# Patient Record
Sex: MALE | Race: BLACK OR AFRICAN AMERICAN | NOT HISPANIC OR LATINO | Employment: FULL TIME | URBAN - METROPOLITAN AREA
[De-identification: names, ages, dates, MRNs, and addresses within clinical notes are randomized per-mention and may not be internally consistent; named-entity substitution may affect disease eponyms.]

---

## 2017-07-18 ENCOUNTER — ALLSCRIPTS OFFICE VISIT (OUTPATIENT)
Dept: OTHER | Facility: OTHER | Age: 37
End: 2017-07-18

## 2018-01-10 NOTE — RESULT NOTES
Verified Results  (1) TSH WITH FT4 REFLEX 29Jun2016 12:34PM [facility] Order Number: [id number]   Order Number: [id number]     Test Name Result Flag Reference   TSH 0 518 uIU/mL  0 358-3 740   Patients undergoing fluorescein dye angiography may retain small amounts of fluorescein in the body for 48-72 hours post procedure  Samples containing fluorescein can produce falsely depressed TSH values  If the patient had this procedure,a specimen should be resubmitted post fluorescein clearance

## 2018-01-14 VITALS
BODY MASS INDEX: 30.24 KG/M2 | OXYGEN SATURATION: 98 % | DIASTOLIC BLOOD PRESSURE: 80 MMHG | HEIGHT: 65 IN | RESPIRATION RATE: 14 BRPM | SYSTOLIC BLOOD PRESSURE: 120 MMHG | WEIGHT: 181.5 LBS | HEART RATE: 100 BPM

## 2018-01-14 NOTE — MISCELLANEOUS
Message  Return to work or school:        Patient is scheduled for a procedure on 10/7/16  He will return to work on 10/10/16  He will require light duty (no lifting> 10 lbs) until 10/14/16  URI Hamm        Signatures   Electronically signed by : URI Hamm ; Sep 18 2016 10:18PM EST

## 2018-01-14 NOTE — PROGRESS NOTES
Assessment    1  Tobacco use (305 1) (Z72 0)   2  Encounter for preventive health examination (V70 0) (Z00 00)    Plan  Blood pressure elevated    · (1) BASIC METABOLIC PROFILE; Status:Active; Requested for:[id number];   Encounter for screening for lipid disorder    · (1) LIPID PANEL, FASTING; Status:Active; Requested for:[id number];     Discussion/Summary  Impression: health maintenance visit  Currently, he eats a healthy diet and has an adequate exercise regimen  Testicular cancer screening: testicular cancer screening is current  He was advised to be evaluated by a dentist      Veronica Herrera was seen and examined in the office today  Physical examination was noted to be largely within normal limits  He denies cardiac symptoms but was noted to be slightly tachycardic today  He is due for screening blood work and this was given to him  Otherwise no other changes were made  The patient was counseled regarding instructions for management  Possible side effects of new medications were reviewed with the patient/guardian today  The treatment plan was reviewed with the patient/guardian  The patient/guardian understands and agrees with the treatment plan      Chief Complaint  Physical      History of Present Illness  HM, Adult Male: The patient is being seen for a health maintenance evaluation  Social History: Work status: working full time and occupation: [employer]  The patient is a current cigarette smoker  General Health: The patient's health since the last visit is described as good  He has regular dental visits  He denies vision problems  He denies hearing loss  Lifestyle:  He consumes a diverse and healthy diet  He does not have any weight concerns  He exercises regularly  He uses tobacco  He consumes alcohol  Screening:   HPI: Patient is here for yearly physical for upcoming camp participation  He has done this in the past as well without trouble  Medical chart was reviewed and updated   He specifically denies any complaints  Review of Systems    Constitutional: No fever or chills, feels well, no tiredness, no recent weight gain or weight loss  Eyes: No complaints of eye pain, no red eyes, no discharge from eyes, no itchy eyes  ENT: no complaints of earache, no hearing loss, no nosebleeds, no nasal discharge, no sore throat, no hoarseness  Cardiovascular: No complaints of slow heart rate, no fast heart rate, no chest pain, no palpitations, no leg claudication, no lower extremity  Respiratory: No complaints of shortness of breath, no wheezing, no cough, no SOB on exertion, no orthopnea or PND  Gastrointestinal: No complaints of abdominal pain, no constipation, no nausea or vomiting, no diarrhea or bloody stools  Genitourinary: no dysuria and no nocturia  Musculoskeletal: arthralgias, but no joint swelling, no limb pain, no myalgias and no joint stiffness  Integumentary: No complaints of skin rash or skin lesions, no itching, no skin wound, no dry skin  Neurological: no headache, no numbness, no tingling and no dizziness  Psychiatric: no anxiety, no sleep disturbances and no depression  Hematologic/Lymphatic: No complaints of swollen glands, no swollen glands in the neck, does not bleed easily, no easy bruising  Active Problems    1  Blood pressure elevated (401 9) (I10)   2  Situational anxiety (300 09) (F41 8)   3   Tobacco use (305 1) (Z72 0)    Past Medical History    · History of Abnormal LFTs (790 6) (R79 89)   · History of Rectal hemorrhage (569 3) (K62 5)    Surgical History    · History of Surgery Vas Deferens Vasectomy    Family History  Mother    · Family history of Hiatal Hernia   · Family history of Hypertension (V17 49)   · Family history of Malignant Melanoma Of The Skin (V16 8)   · Family history of Polyps Of The Sigmoid Colon  Father    · Family history of Family Health Status Of Father - Good  Brother    · Family history of Hypertension (V17 49)  Maternal Grandmother    · Family history of Colon Cancer (V16 0)    Social History    · Being A Social Drinker   · Current Every Day Smoker (305 1)   · Denied: History of Drug Use   · Tobacco use (305 1) (Z72 0)    Current Meds   1  No Reported Medications Recorded    Allergies    1  No Known Drug Allergies    Vitals   Recorded: [id number] 09:47AM Recorded: 59NCF3093 09:21AM   Heart Rate 100 115   Respiration Quality  Normal   Respiration  14   Systolic  696, LUE, Sitting   Diastolic  80, LUE, Sitting   Height  5 ft 5 in   Weight  181 lb 8 oz   BMI Calculated  30 2   BSA Calculated  1 9   O2 Saturation  98, RA     Physical Exam    Constitutional   General appearance: No acute distress, well appearing and well nourished  Head and Face   Head and face: Normal     Palpation of the face and sinuses: No sinus tenderness  Eyes   Conjunctiva and lids: No erythema, swelling or discharge  Pupils and irises: Equal, round, reactive to light  Ears, Nose, Mouth, and Throat   External inspection of ears and nose: Normal     Otoscopic examination: Tympanic membranes translucent with normal light reflex  Canals patent without erythema  Hearing: Normal     Nasal mucosa, septum, and turbinates: Normal without edema or erythema  Oropharynx: Normal with no erythema, edema, exudate or lesions  Neck   Neck: Supple, symmetric, trachea midline, no masses  Thyroid: Normal, no thyromegaly  Pulmonary   Respiratory effort: No increased work of breathing or signs of respiratory distress  Auscultation of lungs: Clear to auscultation  Cardiovascular   Palpation of heart: Normal PMI, no thrills  Auscultation of heart: Normal rate and rhythm, normal S1 and S2, no murmurs  Pedal pulses: 2+ bilaterally  Examination of extremities for edema and/or varicosities: Normal     Abdomen   Abdomen: Non-tender, no masses  Liver and spleen: No hepatomegaly or splenomegaly      Genitourinary   Scrotal contents: Normal testes, no masses  Penis: Normal, no lesions  Lymphatic   Palpation of lymph nodes in neck: No lymphadenopathy  Musculoskeletal   Gait and station: Normal     Inspection/palpation of digits and nails: Normal without clubbing or cyanosis  Inspection/palpation of joints, bones, and muscles: Normal     Range of motion: Normal     Stability: Normal     Muscle strength/tone: Normal     Skin   Skin and subcutaneous tissue: Normal without rashes or lesions  Lipoma like lesion noted of the low back (right)  Neurologic   Cranial nerves: Cranial nerves 2-12 intact  Cortical function: Normal mental status  Reflexes: 2+ and symmetric  Sensation: No sensory loss  Psychiatric   Judgment and insight: Normal     Orientation to person, place and time: Normal     Recent and remote memory: Intact  Mood and affect: Normal        Procedure    Procedure: Indication: routine screening  Inforrmation supplied by Robert graham Snellen chart     Results: 20/20 in both eyes without corrective device, 20/25 in the right eye without corrective device, 20/14 in the left eye without corrective device      Signatures   Electronically signed by : Cordell Todd DO; Jul 18 2017  9:51AM EST                       (Author)

## 2018-04-06 ENCOUNTER — TRANSCRIBE ORDERS (OUTPATIENT)
Dept: LAB | Facility: CLINIC | Age: 38
End: 2018-04-06

## 2018-04-06 ENCOUNTER — OFFICE VISIT (OUTPATIENT)
Dept: FAMILY MEDICINE CLINIC | Facility: CLINIC | Age: 38
End: 2018-04-06
Payer: COMMERCIAL

## 2018-04-06 ENCOUNTER — APPOINTMENT (OUTPATIENT)
Dept: LAB | Facility: CLINIC | Age: 38
End: 2018-04-06
Payer: COMMERCIAL

## 2018-04-06 VITALS
SYSTOLIC BLOOD PRESSURE: 132 MMHG | HEIGHT: 65 IN | DIASTOLIC BLOOD PRESSURE: 100 MMHG | WEIGHT: 197.25 LBS | TEMPERATURE: 98.3 F | HEART RATE: 100 BPM | OXYGEN SATURATION: 98 % | BODY MASS INDEX: 32.86 KG/M2

## 2018-04-06 DIAGNOSIS — I10 ESSENTIAL (PRIMARY) HYPERTENSION: Primary | ICD-10-CM

## 2018-04-06 DIAGNOSIS — I10 ESSENTIAL (PRIMARY) HYPERTENSION: ICD-10-CM

## 2018-04-06 LAB
ALBUMIN SERPL BCP-MCNC: 4.4 G/DL (ref 3.5–5)
ALP SERPL-CCNC: 94 U/L (ref 46–116)
ALT SERPL W P-5'-P-CCNC: 115 U/L (ref 12–78)
ANION GAP SERPL CALCULATED.3IONS-SCNC: 5 MMOL/L (ref 4–13)
AST SERPL W P-5'-P-CCNC: 31 U/L (ref 5–45)
BASOPHILS # BLD AUTO: 0.02 THOUSANDS/ΜL (ref 0–0.1)
BASOPHILS NFR BLD AUTO: 0 % (ref 0–1)
BILIRUB SERPL-MCNC: 0.89 MG/DL (ref 0.2–1)
BUN SERPL-MCNC: 14 MG/DL (ref 5–25)
CALCIUM SERPL-MCNC: 9 MG/DL (ref 8.3–10.1)
CHLORIDE SERPL-SCNC: 105 MMOL/L (ref 100–108)
CHOLEST SERPL-MCNC: 168 MG/DL (ref 50–200)
CO2 SERPL-SCNC: 30 MMOL/L (ref 21–32)
CREAT SERPL-MCNC: 0.82 MG/DL (ref 0.6–1.3)
EOSINOPHIL # BLD AUTO: 0.07 THOUSAND/ΜL (ref 0–0.61)
EOSINOPHIL NFR BLD AUTO: 1 % (ref 0–6)
ERYTHROCYTE [DISTWIDTH] IN BLOOD BY AUTOMATED COUNT: 12.3 % (ref 11.6–15.1)
GFR SERPL CREATININE-BSD FRML MDRD: 131 ML/MIN/1.73SQ M
GLUCOSE P FAST SERPL-MCNC: 88 MG/DL (ref 65–99)
HCT VFR BLD AUTO: 48.6 % (ref 36.5–49.3)
HDLC SERPL-MCNC: 58 MG/DL (ref 40–60)
HGB BLD-MCNC: 16 G/DL (ref 12–17)
LDLC SERPL CALC-MCNC: 96 MG/DL (ref 0–100)
LYMPHOCYTES # BLD AUTO: 3.02 THOUSANDS/ΜL (ref 0.6–4.47)
LYMPHOCYTES NFR BLD AUTO: 34 % (ref 14–44)
MCH RBC QN AUTO: 30.4 PG (ref 26.8–34.3)
MCHC RBC AUTO-ENTMCNC: 32.9 G/DL (ref 31.4–37.4)
MCV RBC AUTO: 92 FL (ref 82–98)
MONOCYTES # BLD AUTO: 0.54 THOUSAND/ΜL (ref 0.17–1.22)
MONOCYTES NFR BLD AUTO: 6 % (ref 4–12)
NEUTROPHILS # BLD AUTO: 5.31 THOUSANDS/ΜL (ref 1.85–7.62)
NEUTS SEG NFR BLD AUTO: 59 % (ref 43–75)
NRBC BLD AUTO-RTO: 0 /100 WBCS
PLATELET # BLD AUTO: 250 THOUSANDS/UL (ref 149–390)
PMV BLD AUTO: 10.2 FL (ref 8.9–12.7)
POTASSIUM SERPL-SCNC: 3.4 MMOL/L (ref 3.5–5.3)
PROT SERPL-MCNC: 7.3 G/DL (ref 6.4–8.2)
RBC # BLD AUTO: 5.26 MILLION/UL (ref 3.88–5.62)
SODIUM SERPL-SCNC: 140 MMOL/L (ref 136–145)
TRIGL SERPL-MCNC: 69 MG/DL
TSH SERPL DL<=0.05 MIU/L-ACNC: 1.53 UIU/ML (ref 0.36–3.74)
WBC # BLD AUTO: 9.01 THOUSAND/UL (ref 4.31–10.16)

## 2018-04-06 PROCEDURE — 85025 COMPLETE CBC W/AUTO DIFF WBC: CPT

## 2018-04-06 PROCEDURE — 84443 ASSAY THYROID STIM HORMONE: CPT

## 2018-04-06 PROCEDURE — 80061 LIPID PANEL: CPT

## 2018-04-06 PROCEDURE — 99213 OFFICE O/P EST LOW 20 MIN: CPT | Performed by: PHYSICIAN ASSISTANT

## 2018-04-06 PROCEDURE — 80053 COMPREHEN METABOLIC PANEL: CPT

## 2018-04-06 PROCEDURE — 36415 COLL VENOUS BLD VENIPUNCTURE: CPT

## 2018-04-06 RX ORDER — LISINOPRIL 10 MG/1
10 TABLET ORAL DAILY
Qty: 30 TABLET | Refills: 1 | Status: SHIPPED | OUTPATIENT
Start: 2018-04-06 | End: 2018-05-17 | Stop reason: SDUPTHER

## 2018-04-06 NOTE — PROGRESS NOTES
[facility]  FAMILY PRACTICE OFFICE VISIT       NAME: Ana Lilia Lala  AGE: 40 y o  SEX: male       : 1980        MRN: [id number]    DATE: 2018  TIME: 5:47 PM    Assessment and Plan     Problem List Items Addressed This Visit     Essential (primary) hypertension - Primary       I reviewed with the patient that his blood pressure is elevated on exam   He was restarted on the lisinopril that he reported using previously was at a higher dose of 10 mg daily  He was encouraged to get labs completed is ordered today  He should follow-up in about 1 month  He should call with problems or concerns  Relevant Medications    lisinopril (ZESTRIL) 10 mg tablet    Other Relevant Orders    Comprehensive metabolic panel (Completed)    Lipid Panel with Direct LDL reflex (Completed)    TSH, 3rd generation with T4 reflex (Completed)    CBC and differential (Completed)          Essential (primary) hypertension    I reviewed with the patient that his blood pressure is elevated on exam   He was restarted on the lisinopril that he reported using previously was at a higher dose of 10 mg daily  He was encouraged to get labs completed is ordered today  He should follow-up in about 1 month  He should call with problems or concerns  Chief Complaint     Chief Complaint   Patient presents with    Headache    High Blood Pressure     for a couple weeks        History of Present Illness   Ana Lilia Lala is a 40y o -year-old male who presents for high blood pressure and headaches  The patient presents today to follow-up on his blood pressure and headaches that he has been having over the last few weeks  He reports that his blood pressure is not checked  His last visit here in the office was about 9 months ago with Dr Carlyle Michelle  His blood pressure was 120/80 at that time  He reports that he was on lisinopril 5 mg daily at one point a few years ago   He was provided a slip for labs, which do not appear to been completed  He denies any chest pain, shortness of breath, dizziness, or blurry vision  Review of Systems   Review of Systems   Constitutional: Positive for fatigue (works night shift)  Negative for chills and fever  Respiratory: Negative for shortness of breath  Cardiovascular: Negative for chest pain, palpitations and leg swelling  Neurological: Positive for headaches  Negative for dizziness  Active Problem List     Patient Active Problem List   Diagnosis    Essential (primary) hypertension    Situational anxiety         Past Medical History:  Past Medical History:   Diagnosis Date    Abnormal LFTs     Rectal hemorrhage        Past Surgical History:  Past Surgical History:   Procedure Laterality Date    VASECTOMY      Vas Deferens       Family History:  Family History   Problem Relation Age of Onset    Hiatal hernia Mother     Hypertension Mother     Skin cancer Mother     Colon polyps Mother      Of the sigmoid colon    Hypertension Brother     Colon cancer Maternal Grandmother        Social History:  Social History     Social History    Marital status: /Civil Union     Spouse name: N/A    Number of children: N/A    Years of education: N/A     Occupational History    Not on file       Social History Main Topics    Smoking status: Current Every Day Smoker     Packs/day: 0 25    Smokeless tobacco: Never Used      Comment: tobacco use    Alcohol use Yes      Comment: Being a social drinker    Drug use: No    Sexual activity: Not on file     Other Topics Concern    Not on file     Social History Narrative    No narrative on file       Objective     Vitals:    04/06/18 1140   BP: 132/100   Pulse: 100   Temp: 98 3 °F (36 8 °C)   SpO2: 98%     Wt Readings from Last 3 Encounters:   04/06/18 89 5 kg (197 lb 4 oz)   07/18/17 82 3 kg (181 lb 8 oz)   10/27/16 81 3 kg (179 lb 2 1 oz)       Physical Exam   Constitutional: He appears well-developed and well-nourished  HENT:   Head: Normocephalic and atraumatic  Neck: Neck supple  No thyromegaly present  Cardiovascular: Normal rate, regular rhythm, normal heart sounds and intact distal pulses  No murmur heard  No carotid bruits noted   Pulmonary/Chest: Effort normal and breath sounds normal    Musculoskeletal: He exhibits no edema  Lymphadenopathy:     He has no cervical adenopathy  Neurological: He is alert  Skin: Skin is warm and dry  Psychiatric: He has a normal mood and affect  Vitals reviewed  Pertinent Laboratory/Diagnostic Studies:  Lab Results   Component Value Date    GLUCOSE 101 11/11/2014    BUN 14 04/06/2018    CREATININE 0 82 04/06/2018    CALCIUM 9 0 04/06/2018     04/06/2018    K 3 4 (L) 04/06/2018    CO2 30 04/06/2018     04/06/2018     Lab Results   Component Value Date     (H) 04/06/2018    AST 31 04/06/2018    ALKPHOS 94 04/06/2018    BILITOT 0 89 04/06/2018       Lab Results   Component Value Date    WBC 9 01 04/06/2018    HGB 16 0 04/06/2018    HCT 48 6 04/06/2018    MCV 92 04/06/2018     04/06/2018       Lab Results   Component Value Date    CHOL 168 04/06/2018     Lab Results   Component Value Date    TRIG 69 04/06/2018     Lab Results   Component Value Date    HDL 58 04/06/2018     Lab Results   Component Value Date    LDLCALC 96 04/06/2018           Orders Placed This Encounter   Procedures    Comprehensive metabolic panel    Lipid Panel with Direct LDL reflex    TSH, 3rd generation with T4 reflex    CBC and differential       ALLERGIES:  No Known Allergies    Current Medications     Current Outpatient Prescriptions   Medication Sig Dispense Refill    lisinopril (ZESTRIL) 10 mg tablet Take 1 tablet (10 mg total) by mouth daily 30 tablet 1     No current facility-administered medications for this visit  Health Maintenance   There are no preventive care reminders to display for this patient    Immunization History   Administered Date(s) Administered    Influenza Quadrivalent Preservative Free 3 years and older IM 11/11/2014    Tdap 08/03/2012       Wendi Flores PA-C  4/6/2018 5:47 PM  [facility]

## 2018-04-06 NOTE — ASSESSMENT & PLAN NOTE
I reviewed with the patient that his blood pressure is elevated on exam   He was restarted on the lisinopril that he reported using previously was at a higher dose of 10 mg daily  He was encouraged to get labs completed is ordered today  He should follow-up in about 1 month  He should call with problems or concerns

## 2018-04-09 DIAGNOSIS — E87.6 LOW BLOOD POTASSIUM: Primary | ICD-10-CM

## 2018-05-14 ENCOUNTER — APPOINTMENT (OUTPATIENT)
Dept: LAB | Facility: CLINIC | Age: 38
End: 2018-05-14
Payer: COMMERCIAL

## 2018-05-14 DIAGNOSIS — E87.6 LOW BLOOD POTASSIUM: ICD-10-CM

## 2018-05-14 LAB
ALBUMIN SERPL BCP-MCNC: 4.4 G/DL (ref 3.5–5)
ALP SERPL-CCNC: 81 U/L (ref 46–116)
ALT SERPL W P-5'-P-CCNC: 42 U/L (ref 12–78)
ANION GAP SERPL CALCULATED.3IONS-SCNC: 8 MMOL/L (ref 4–13)
AST SERPL W P-5'-P-CCNC: 11 U/L (ref 5–45)
BILIRUB SERPL-MCNC: 1.6 MG/DL (ref 0.2–1)
BUN SERPL-MCNC: 12 MG/DL (ref 5–25)
CALCIUM SERPL-MCNC: 9.3 MG/DL (ref 8.3–10.1)
CHLORIDE SERPL-SCNC: 102 MMOL/L (ref 100–108)
CO2 SERPL-SCNC: 29 MMOL/L (ref 21–32)
CREAT SERPL-MCNC: 0.78 MG/DL (ref 0.6–1.3)
GFR SERPL CREATININE-BSD FRML MDRD: 132 ML/MIN/1.73SQ M
GLUCOSE P FAST SERPL-MCNC: 80 MG/DL (ref 65–99)
POTASSIUM SERPL-SCNC: 3.4 MMOL/L (ref 3.5–5.3)
PROT SERPL-MCNC: 7.4 G/DL (ref 6.4–8.2)
SODIUM SERPL-SCNC: 139 MMOL/L (ref 136–145)

## 2018-05-14 PROCEDURE — 80053 COMPREHEN METABOLIC PANEL: CPT

## 2018-05-14 PROCEDURE — 36415 COLL VENOUS BLD VENIPUNCTURE: CPT

## 2018-05-16 NOTE — PROGRESS NOTES
[facility]  FAMILY PRACTICE OFFICE VISIT       NAME: Saji Mcintyre  AGE: 45 y o  SEX: male       : 1980        MRN: [id number]    DATE: 2018  TIME: 6:44 PM    Assessment and Plan     Problem List Items Addressed This Visit     Essential (primary) hypertension - Primary     Improved since last visit  He will continue with the lisinopril 10 mg daily  He did have a slight elevation of his diastolic reading today, but he did not take the medication yet today  He will return in 3 months for a recheck  He was encouraged to work on diet and exercise as well  Relevant Medications    lisinopril (ZESTRIL) 10 mg tablet    Abnormal LFTs       We reviewed that he had an elevated ALT of 115 on his labs last month; however, this normalized with his repeat labs done several days ago  His bilirubin now is elevated at 1 60 (and was previously normal at 0 98 last month )  We reviewed that his liver felt questionably enlarged on exam today  He was sent for an ultrasound to further assess this  He will also recheck a hepatic function panel in about 1 month  Relevant Orders    Hepatic function panel    Basic metabolic panel    US abdomen limited    Hypokalemia       We reviewed that he has a stable minimally decreased potassium level of 3 4  He has recently started eating potassium rich foods since finding out that was still decreased on his repeat labs done a few days ago  We will recheck this next month with his attic function panel  Relevant Orders    Basic metabolic panel          Essential (primary) hypertension  Improved since last visit  He will continue with the lisinopril 10 mg daily  He did have a slight elevation of his diastolic reading today, but he did not take the medication yet today  He will return in 3 months for a recheck  He was encouraged to work on diet and exercise as well       Abnormal LFTs    We reviewed that he had an elevated ALT of 115 on his labs last month; however, this normalized with his repeat labs done several days ago  His bilirubin now is elevated at 1 60 (and was previously normal at 0 98 last month )  We reviewed that his liver felt questionably enlarged on exam today  He was sent for an ultrasound to further assess this  He will also recheck a hepatic function panel in about 1 month  Hypokalemia    We reviewed that he has a stable minimally decreased potassium level of 3 4  He has recently started eating potassium rich foods since finding out that was still decreased on his repeat labs done a few days ago  We will recheck this next month with his attic function panel  Chief Complaint     Chief Complaint   Patient presents with    Follow-up     Month       History of Present Illness   Scar Quijano is a 45y o -year-old male who presents for 1 month blood pressure follow-up  Patient presents today for 1 month follow-up on his blood pressure  He presented last month with headaches and elevated blood pressure readings  He was restarted on lisinopril at the 10 mg daily dose  He reports that since that visit, his blood pressure readings have not been checked at home  He denies side effects from the medication such as cough  He denies symptoms of poor control such as ongoing headaches, chest pain dizziness blurry vision, or leg swelling  Of note, last month after his visit, he did blood work which included a normal cholesterol panel, normal glucose, normal renal function, normal blood cell counts  He was noted to have a mildly decreased potassium level of 3 4 as well as an elevated ALT of 115  He was asked to repeat these labs and this was done a few days ago  It showed he stable potassium level of 3 4 as well as resolution of his ALT elevation  However he this time elevation of his bilirubin at 1 60  He denies much alcohol use - occasional beer   He denies any recent abdominal pain, n/v           Review of Systems   Review of Systems   Constitutional: Negative for chills and fever  Respiratory: Negative for shortness of breath  Cardiovascular: Negative for chest pain, palpitations and leg swelling  Gastrointestinal: Negative for abdominal pain, nausea and vomiting  Neurological: Negative for dizziness and headaches  Active Problem List     Patient Active Problem List   Diagnosis    Essential (primary) hypertension    Situational anxiety    Abnormal LFTs    Hypokalemia         Past Medical History:  Past Medical History:   Diagnosis Date    Abnormal LFTs     Rectal hemorrhage        Past Surgical History:  Past Surgical History:   Procedure Laterality Date    VASECTOMY      Vas Deferens       Family History:  Family History   Problem Relation Age of Onset    Hiatal hernia Mother     Hypertension Mother     Skin cancer Mother     Colon polyps Mother      Of the sigmoid colon    Hypertension Brother     Colon cancer Maternal Grandmother        Social History:  Social History     Social History    Marital status: /Civil Union     Spouse name: N/A    Number of children: N/A    Years of education: N/A     Occupational History    Not on file  Social History Main Topics    Smoking status: Current Every Day Smoker     Packs/day: 0 25    Smokeless tobacco: Never Used      Comment: tobacco use    Alcohol use Yes      Comment: Being a social drinker    Drug use: No    Sexual activity: Not on file     Other Topics Concern    Not on file     Social History Narrative    No narrative on file       Objective     Vitals:    05/17/18 1134   BP: 126/90   Pulse:    SpO2:      Wt Readings from Last 3 Encounters:   05/17/18 88 5 kg (195 lb)   04/06/18 89 5 kg (197 lb 4 oz)   07/18/17 82 3 kg (181 lb 8 oz)       Physical Exam   Constitutional: He appears well-developed and well-nourished  HENT:   Head: Normocephalic and atraumatic  Neck: Neck supple   No thyromegaly present  Cardiovascular: Normal rate, regular rhythm, normal heart sounds and intact distal pulses  No murmur heard  No carotid bruits noted   Pulmonary/Chest: Effort normal and breath sounds normal    Musculoskeletal: He exhibits no edema  Lymphadenopathy:     He has no cervical adenopathy  Neurological: He is alert  Psychiatric: He has a normal mood and affect         Pertinent Laboratory/Diagnostic Studies:  Lab Results   Component Value Date    GLUCOSE 101 11/11/2014    BUN 12 05/14/2018    CREATININE 0 78 05/14/2018    CALCIUM 9 3 05/14/2018     05/14/2018    K 3 4 (L) 05/14/2018    CO2 29 05/14/2018     05/14/2018     Lab Results   Component Value Date    ALT 42 05/14/2018    AST 11 05/14/2018    ALKPHOS 81 05/14/2018    BILITOT 1 60 (H) 05/14/2018       Lab Results   Component Value Date    WBC 9 01 04/06/2018    HGB 16 0 04/06/2018    HCT 48 6 04/06/2018    MCV 92 04/06/2018     04/06/2018     Lab Results   Component Value Date    CHOL 168 04/06/2018     Lab Results   Component Value Date    TRIG 69 04/06/2018     Lab Results   Component Value Date    HDL 58 04/06/2018     Lab Results   Component Value Date    LDLCALC 96 04/06/2018     Results for orders placed or performed in visit on 05/14/18   Comprehensive metabolic panel   Result Value Ref Range    Sodium 139 136 - 145 mmol/L    Potassium 3 4 (L) 3 5 - 5 3 mmol/L    Chloride 102 100 - 108 mmol/L    CO2 29 21 - 32 mmol/L    Anion Gap 8 4 - 13 mmol/L    BUN 12 5 - 25 mg/dL    Creatinine 0 78 0 60 - 1 30 mg/dL    Glucose, Fasting 80 65 - 99 mg/dL    Calcium 9 3 8 3 - 10 1 mg/dL    AST 11 5 - 45 U/L    ALT 42 12 - 78 U/L    Alkaline Phosphatase 81 46 - 116 U/L    Total Protein 7 4 6 4 - 8 2 g/dL    Albumin 4 4 3 5 - 5 0 g/dL    Total Bilirubin 1 60 (H) 0 20 - 1 00 mg/dL    eGFR 132 ml/min/1 73sq m       Orders Placed This Encounter   Procedures    US abdomen limited    Hepatic function panel    Basic metabolic panel ALLERGIES:  No Known Allergies    Current Medications     Current Outpatient Prescriptions   Medication Sig Dispense Refill    lisinopril (ZESTRIL) 10 mg tablet Take 1 tablet (10 mg total) by mouth daily 90 tablet 3     No current facility-administered medications for this visit            Health Maintenance     Health Maintenance   Topic Date Due    HIV SCREENING  1980    INFLUENZA VACCINE  09/01/2018    Depression Screening PHQ-9  05/17/2019    DTaP,Tdap,and Td Vaccines (2 - Td) 08/03/2022     Immunization History   Administered Date(s) Administered    Influenza Quadrivalent Preservative Free 3 years and older IM 11/11/2014    Tdap 08/03/2012       Cody Arrington PA-C  5/17/2018 6:44 PM  [facility]

## 2018-05-17 ENCOUNTER — OFFICE VISIT (OUTPATIENT)
Dept: FAMILY MEDICINE CLINIC | Facility: CLINIC | Age: 38
End: 2018-05-17
Payer: COMMERCIAL

## 2018-05-17 VITALS
BODY MASS INDEX: 32.49 KG/M2 | DIASTOLIC BLOOD PRESSURE: 90 MMHG | HEART RATE: 92 BPM | WEIGHT: 195 LBS | HEIGHT: 65 IN | SYSTOLIC BLOOD PRESSURE: 126 MMHG | OXYGEN SATURATION: 95 %

## 2018-05-17 DIAGNOSIS — R79.89 ABNORMAL LFTS: ICD-10-CM

## 2018-05-17 DIAGNOSIS — E87.6 HYPOKALEMIA: ICD-10-CM

## 2018-05-17 DIAGNOSIS — I10 ESSENTIAL (PRIMARY) HYPERTENSION: Primary | ICD-10-CM

## 2018-05-17 PROCEDURE — 99213 OFFICE O/P EST LOW 20 MIN: CPT | Performed by: PHYSICIAN ASSISTANT

## 2018-05-17 RX ORDER — LISINOPRIL 10 MG/1
10 TABLET ORAL DAILY
Qty: 90 TABLET | Refills: 3 | Status: SHIPPED | OUTPATIENT
Start: 2018-05-17 | End: 2018-09-27 | Stop reason: SDUPTHER

## 2018-05-17 NOTE — ASSESSMENT & PLAN NOTE
We reviewed that he has a stable minimally decreased potassium level of 3 4  He has recently started eating potassium rich foods since finding out that was still decreased on his repeat labs done a few days ago  We will recheck this next month with his attic function panel

## 2018-05-17 NOTE — ASSESSMENT & PLAN NOTE
Improved since last visit  He will continue with the lisinopril 10 mg daily  He did have a slight elevation of his diastolic reading today, but he did not take the medication yet today  He will return in 3 months for a recheck  He was encouraged to work on diet and exercise as well

## 2018-05-17 NOTE — ASSESSMENT & PLAN NOTE
We reviewed that he had an elevated ALT of 115 on his labs last month; however, this normalized with his repeat labs done several days ago  His bilirubin now is elevated at 1 60 (and was previously normal at 0 98 last month )  We reviewed that his liver felt questionably enlarged on exam today  He was sent for an ultrasound to further assess this  He will also recheck a hepatic function panel in about 1 month

## 2018-05-17 NOTE — PATIENT INSTRUCTIONS
Essential (primary) hypertension  Improved since last visit  He will continue with the lisinopril 10 mg daily  He did have a slight elevation of his diastolic reading today, but he did not take the medication yet today  He will return in 3 months for a recheck  He was encouraged to work on diet and exercise as well  Abnormal LFTs    We reviewed that he had an elevated ALT of 115 on his labs last month; however, this normalized with his repeat labs done several days ago  His bilirubin now is elevated at 1 60 (and was previously normal at 0 98 last month )  We reviewed that his liver felt questionably enlarged on exam today  He was sent for an ultrasound to further assess this  He will also recheck a hepatic function panel in about 1 month  Hypokalemia    We reviewed that he has a stable minimally decreased potassium level of 3 4  He has recently started eating potassium rich foods since finding out that was still decreased on his repeat labs done a few days ago  We will recheck this next month with his attic function panel

## 2018-05-30 DIAGNOSIS — I10 ESSENTIAL (PRIMARY) HYPERTENSION: ICD-10-CM

## 2018-05-31 RX ORDER — LISINOPRIL 10 MG/1
10 TABLET ORAL DAILY
Qty: 30 TABLET | Refills: 3 | Status: SHIPPED | OUTPATIENT
Start: 2018-05-31 | End: 2018-08-28 | Stop reason: SDUPTHER

## 2018-08-25 NOTE — PROGRESS NOTES
[facility]  FAMILY PRACTICE OFFICE VISIT       NAME: Saji Mcintyre  AGE: 45 y o  SEX: male       : 1980        MRN: [id number]    DATE: 2018  TIME: 8:36 AM    Assessment and Plan     Problem List Items Addressed This Visit     Essential (primary) hypertension - Primary     Controlled  Continue lisinopril 10 mg daily  Aim for consistent daily use  Recheck in 6 months  Call with concerns in the interim  Work on weight loss - aim for 10 pounds by next visit  Abnormal LFTs     Improved  Will continue to monitor  Hypokalemia     Resolved  Will continue to monitor  Encouraged to continue potassium rich foods like bananas and tomatoes  Other Visit Diagnoses     Acute sinusitis, recurrence not specified, unspecified location        increase fluids and rest  Start Augmentin twice daily x 7 days  Continus Mucinex  Try nasal rinses  Call if worsens or fails to improve  Relevant Medications    amoxicillin-clavulanate (AUGMENTIN) 875-125 mg per tablet          Abnormal LFTs  Improved  Will continue to monitor  Hypokalemia  Resolved  Will continue to monitor  Encouraged to continue potassium rich foods like bananas and tomatoes  Essential (primary) hypertension  Controlled  Continue lisinopril 10 mg daily  Aim for consistent daily use  Recheck in 6 months  Call with concerns in the interim  Work on weight loss - aim for 10 pounds by next visit  Chief Complaint     Chief Complaint   Patient presents with    Follow-up     blood pressure     URI     coughing, chest congestion       History of Present Illness   Saji Mcintyre is a 45y o -year-old male who presents for 3 month follow-up visit  The patient reports that current blood pressure medications include lisinopril 10 mg daily  Blood pressure readings at home are not checked    The patient denies symptoms of poor control such as chest pain, shortness of breath, leg swelling, vision changes, headaches, or dizziness  The patient reports occasional soda for caffeine intake and unlimited salt intake  At the time of his last appointment about 3 months ago, it was noted that his ALT was elevated at 115 and he had also had an increase in bilirubin to 1 60  We discussed that his liver felt questionably enlarged on exam   He was given slips to have an ultrasound of the liver as well as a repeat hepatic function panel in 1 month  The labs were repeated and normalized but the ultrasound was not completed  He had also had a stable minimally decreased potassium level 3 4  He had been it encouraged to increase his intake of potassium rich foods with the plan to recheck that in 1 month with his liver function enzymes  The potassium normalized     Cough   This is a new problem  The current episode started more than 1 month ago  The problem has been unchanged (but worse when sleeps)  The cough is productive of purulent sputum (yellow)  Associated symptoms include nasal congestion and a sore throat  Pertinent negatives include no chest pain, chills, fever, headaches or shortness of breath  Associated symptoms comments: A lot of nasal discharge as well  The symptoms are aggravated by lying down  Treatments tried: Flonase, Mucinex, and other OTC meds  The treatment provided no relief  There is no history of asthma or environmental allergies  Review of Systems   Review of Systems   Constitutional: Negative for chills and fever  HENT: Positive for sore throat  Respiratory: Positive for cough  Negative for shortness of breath  Cardiovascular: Negative for chest pain, palpitations and leg swelling  Allergic/Immunologic: Negative for environmental allergies  Neurological: Negative for dizziness and headaches         Active Problem List     Patient Active Problem List   Diagnosis    Essential (primary) hypertension    Situational anxiety    Abnormal LFTs    Hypokalemia Past Medical History:  Past Medical History:   Diagnosis Date    Abnormal LFTs     Rectal hemorrhage        Past Surgical History:  Past Surgical History:   Procedure Laterality Date    VASECTOMY      Vas Deferens       Family History:  Family History   Problem Relation Age of Onset    Hiatal hernia Mother     Hypertension Mother     Skin cancer Mother     Colon polyps Mother         Of the sigmoid colon    Hypertension Brother     Colon cancer Maternal Grandmother        Social History:  Social History     Social History    Marital status: /Civil Union     Spouse name: N/A    Number of children: N/A    Years of education: N/A     Occupational History    Not on file  Social History Main Topics    Smoking status: Current Every Day Smoker     Packs/day: 0 25    Smokeless tobacco: Never Used      Comment: tobacco use    Alcohol use Yes      Comment: Being a social drinker    Drug use: No    Sexual activity: Not on file     Other Topics Concern    Not on file     Social History Narrative    No narrative on file       Objective     Vitals:    08/28/18 0807 08/28/18 0830   BP: 118/94 122/88   BP Location: Left arm    Patient Position: Sitting    Cuff Size: Large    Pulse: 86    Temp: (!) 97 2 °F (36 2 °C)    SpO2: 96%    Weight: 89 4 kg (197 lb 2 oz)    Height: 5' 5" (1 651 m)      Wt Readings from Last 3 Encounters:   08/28/18 89 4 kg (197 lb 2 oz)   05/17/18 88 5 kg (195 lb)   04/06/18 89 5 kg (197 lb 4 oz)       Physical Exam   Constitutional: He appears well-developed and well-nourished  HENT:   Head: Normocephalic and atraumatic  Right Ear: External ear normal    Left Ear: External ear normal    Nose: Mucosal edema (b/l) present  Right sinus exhibits no maxillary sinus tenderness and no frontal sinus tenderness  Left sinus exhibits no maxillary sinus tenderness and no frontal sinus tenderness  Mouth/Throat: Oropharynx is clear and moist    PND noted   Neck: Neck supple   No thyromegaly present  Cardiovascular: Normal rate, regular rhythm, normal heart sounds and intact distal pulses  No murmur heard  No carotid bruits noted   Pulmonary/Chest: Effort normal and breath sounds normal  He has no wheezes  He has no rales  Musculoskeletal: He exhibits no edema  Lymphadenopathy:     He has no cervical adenopathy  Neurological: He is alert  Psychiatric: He has a normal mood and affect         Pertinent Laboratory/Diagnostic Studies:  Lab Results   Component Value Date    GLUCOSE 101 11/11/2014    BUN 13 08/27/2018    CREATININE 0 82 08/27/2018    CALCIUM 8 9 08/27/2018     08/27/2018    K 3 5 08/27/2018    CO2 28 08/27/2018     08/27/2018     Lab Results   Component Value Date    ALT 36 08/27/2018    AST 12 08/27/2018    ALKPHOS 83 08/27/2018    BILITOT 1 73 (H) 11/11/2014       Lab Results   Component Value Date    WBC 9 01 04/06/2018    HGB 16 0 04/06/2018    HCT 48 6 04/06/2018    MCV 92 04/06/2018     04/06/2018     Lab Results   Component Value Date    CHOL 165 11/11/2014     Lab Results   Component Value Date    TRIG 69 04/06/2018     Lab Results   Component Value Date    HDL 58 04/06/2018     Lab Results   Component Value Date    LDLCALC 96 04/06/2018     Results for orders placed or performed in visit on 08/27/18   Hepatic function panel   Result Value Ref Range    Total Bilirubin 0 98 0 20 - 1 00 mg/dL    Bilirubin, Direct 0 24 (H) 0 00 - 0 20 mg/dL    Alkaline Phosphatase 83 46 - 116 U/L    AST 12 5 - 45 U/L    ALT 36 12 - 78 U/L    Total Protein 7 2 6 4 - 8 2 g/dL    Albumin 4 0 3 5 - 5 0 g/dL   Basic metabolic panel   Result Value Ref Range    Sodium 140 136 - 145 mmol/L    Potassium 3 5 3 5 - 5 3 mmol/L    Chloride 103 100 - 108 mmol/L    CO2 28 21 - 32 mmol/L    ANION GAP 9 4 - 13 mmol/L    BUN 13 5 - 25 mg/dL    Creatinine 0 82 0 60 - 1 30 mg/dL    Glucose, Fasting 79 65 - 99 mg/dL    Calcium 8 9 8 3 - 10 1 mg/dL    eGFR 130 ml/min/1 73sq m ALLERGIES:  No Known Allergies    Current Medications     Current Outpatient Prescriptions   Medication Sig Dispense Refill    amoxicillin-clavulanate (AUGMENTIN) 875-125 mg per tablet Take 1 tablet by mouth every 12 (twelve) hours for 7 days 14 tablet 0    lisinopril (ZESTRIL) 10 mg tablet Take 1 tablet (10 mg total) by mouth daily 90 tablet 3     No current facility-administered medications for this visit            Health Maintenance     Health Maintenance   Topic Date Due    Pneumococcal PPSV23 Medium Risk Adult (1 of 1 - PPSV23) 04/09/1999    INFLUENZA VACCINE  09/01/2018    Depression Screening PHQ-9  05/17/2019    DTaP,Tdap,and Td Vaccines (2 - Td) 08/03/2022     Immunization History   Administered Date(s) Administered    Influenza Quadrivalent Preservative Free 3 years and older IM 11/11/2014    Tdap 08/03/2012       Kade Lang PA-C  8/28/2018 8:36 AM  [facility]

## 2018-08-27 ENCOUNTER — APPOINTMENT (OUTPATIENT)
Dept: LAB | Facility: CLINIC | Age: 38
End: 2018-08-27
Payer: COMMERCIAL

## 2018-08-27 DIAGNOSIS — R79.89 ABNORMAL LFTS: ICD-10-CM

## 2018-08-27 DIAGNOSIS — E87.6 HYPOKALEMIA: ICD-10-CM

## 2018-08-27 LAB
ALBUMIN SERPL BCP-MCNC: 4 G/DL (ref 3.5–5)
ALP SERPL-CCNC: 83 U/L (ref 46–116)
ALT SERPL W P-5'-P-CCNC: 36 U/L (ref 12–78)
ANION GAP SERPL CALCULATED.3IONS-SCNC: 9 MMOL/L (ref 4–13)
AST SERPL W P-5'-P-CCNC: 12 U/L (ref 5–45)
BILIRUB DIRECT SERPL-MCNC: 0.24 MG/DL (ref 0–0.2)
BILIRUB SERPL-MCNC: 0.98 MG/DL (ref 0.2–1)
BUN SERPL-MCNC: 13 MG/DL (ref 5–25)
CALCIUM SERPL-MCNC: 8.9 MG/DL (ref 8.3–10.1)
CHLORIDE SERPL-SCNC: 103 MMOL/L (ref 100–108)
CO2 SERPL-SCNC: 28 MMOL/L (ref 21–32)
CREAT SERPL-MCNC: 0.82 MG/DL (ref 0.6–1.3)
GFR SERPL CREATININE-BSD FRML MDRD: 130 ML/MIN/1.73SQ M
GLUCOSE P FAST SERPL-MCNC: 79 MG/DL (ref 65–99)
POTASSIUM SERPL-SCNC: 3.5 MMOL/L (ref 3.5–5.3)
PROT SERPL-MCNC: 7.2 G/DL (ref 6.4–8.2)
SODIUM SERPL-SCNC: 140 MMOL/L (ref 136–145)

## 2018-08-27 PROCEDURE — 36415 COLL VENOUS BLD VENIPUNCTURE: CPT

## 2018-08-27 PROCEDURE — 80048 BASIC METABOLIC PNL TOTAL CA: CPT

## 2018-08-27 PROCEDURE — 80076 HEPATIC FUNCTION PANEL: CPT

## 2018-08-28 ENCOUNTER — OFFICE VISIT (OUTPATIENT)
Dept: FAMILY MEDICINE CLINIC | Facility: CLINIC | Age: 38
End: 2018-08-28
Payer: COMMERCIAL

## 2018-08-28 VITALS
SYSTOLIC BLOOD PRESSURE: 122 MMHG | OXYGEN SATURATION: 96 % | DIASTOLIC BLOOD PRESSURE: 88 MMHG | HEART RATE: 86 BPM | WEIGHT: 197.13 LBS | BODY MASS INDEX: 32.84 KG/M2 | TEMPERATURE: 97.2 F | HEIGHT: 65 IN

## 2018-08-28 DIAGNOSIS — E87.6 HYPOKALEMIA: ICD-10-CM

## 2018-08-28 DIAGNOSIS — R79.89 ABNORMAL LFTS: ICD-10-CM

## 2018-08-28 DIAGNOSIS — J01.90 ACUTE SINUSITIS, RECURRENCE NOT SPECIFIED, UNSPECIFIED LOCATION: ICD-10-CM

## 2018-08-28 DIAGNOSIS — I10 ESSENTIAL (PRIMARY) HYPERTENSION: Primary | ICD-10-CM

## 2018-08-28 PROCEDURE — 3079F DIAST BP 80-89 MM HG: CPT | Performed by: PHYSICIAN ASSISTANT

## 2018-08-28 PROCEDURE — 3074F SYST BP LT 130 MM HG: CPT | Performed by: PHYSICIAN ASSISTANT

## 2018-08-28 PROCEDURE — 99214 OFFICE O/P EST MOD 30 MIN: CPT | Performed by: PHYSICIAN ASSISTANT

## 2018-08-28 PROCEDURE — 3008F BODY MASS INDEX DOCD: CPT | Performed by: PHYSICIAN ASSISTANT

## 2018-08-28 RX ORDER — AMOXICILLIN AND CLAVULANATE POTASSIUM 875; 125 MG/1; MG/1
1 TABLET, FILM COATED ORAL EVERY 12 HOURS SCHEDULED
Qty: 14 TABLET | Refills: 0 | Status: SHIPPED | OUTPATIENT
Start: 2018-08-28 | End: 2018-09-04

## 2018-08-28 NOTE — PATIENT INSTRUCTIONS
Problem List Items Addressed This Visit     Essential (primary) hypertension - Primary     Controlled  Continue lisinopril 10 mg daily  Aim for consistent daily use  Recheck in 6 months  Call with concerns in the interim  Work on weight loss - aim for 10 pounds by next visit  Abnormal LFTs     Improved  Will continue to monitor  Hypokalemia     Resolved  Will continue to monitor  Encouraged to continue potassium rich foods like bananas and tomatoes  Other Visit Diagnoses     Acute sinusitis, recurrence not specified, unspecified location        increase fluids and rest  Start Augmentin twice daily x 7 days  Continus Mucinex  Try nasal rinses  Call if worsens or fails to improve       Relevant Medications    amoxicillin-clavulanate (AUGMENTIN) 875-125 mg per tablet

## 2018-08-28 NOTE — ASSESSMENT & PLAN NOTE
Resolved  Will continue to monitor  Encouraged to continue potassium rich foods like bananas and tomatoes

## 2018-08-28 NOTE — ASSESSMENT & PLAN NOTE
Controlled  Continue lisinopril 10 mg daily  Aim for consistent daily use  Recheck in 6 months  Call with concerns in the interim  Work on weight loss - aim for 10 pounds by next visit

## 2018-09-27 DIAGNOSIS — I10 ESSENTIAL (PRIMARY) HYPERTENSION: ICD-10-CM

## 2018-09-27 RX ORDER — LISINOPRIL 10 MG/1
10 TABLET ORAL DAILY
Qty: 90 TABLET | Refills: 1 | Status: SHIPPED | OUTPATIENT
Start: 2018-09-27 | End: 2018-12-07 | Stop reason: SDUPTHER

## 2018-11-06 ENCOUNTER — OFFICE VISIT (OUTPATIENT)
Dept: FAMILY MEDICINE CLINIC | Facility: CLINIC | Age: 38
End: 2018-11-06
Payer: COMMERCIAL

## 2018-11-06 VITALS
SYSTOLIC BLOOD PRESSURE: 120 MMHG | DIASTOLIC BLOOD PRESSURE: 82 MMHG | WEIGHT: 191.5 LBS | HEIGHT: 65 IN | BODY MASS INDEX: 31.9 KG/M2

## 2018-11-06 DIAGNOSIS — F32.2 CURRENT SEVERE EPISODE OF MAJOR DEPRESSIVE DISORDER WITHOUT PSYCHOTIC FEATURES WITHOUT PRIOR EPISODE (HCC): Primary | ICD-10-CM

## 2018-11-06 PROCEDURE — 3008F BODY MASS INDEX DOCD: CPT | Performed by: PHYSICIAN ASSISTANT

## 2018-11-06 PROCEDURE — 99213 OFFICE O/P EST LOW 20 MIN: CPT | Performed by: PHYSICIAN ASSISTANT

## 2018-11-06 RX ORDER — ESCITALOPRAM OXALATE 10 MG/1
TABLET ORAL
Qty: 30 TABLET | Refills: 1 | Status: SHIPPED | OUTPATIENT
Start: 2018-11-06 | End: 2018-12-07 | Stop reason: SDUPTHER

## 2018-11-06 NOTE — ASSESSMENT & PLAN NOTE
Reviewed with the patient that he has severe depression currently  He was started on Lexapro 10 mg  He will start with a half tablet daily for the 1st week and then increase to a whole tablet daily  Encouraged to follow up in 1 month  He was encouraged to follow-up with a therapist as well as he has already started to look into  He was also encouraged to call with any problems or concerns prior to his next visit  He was encouraged to check labs as ordered today as well to assess for any contributing medical source of his depression

## 2018-11-06 NOTE — PROGRESS NOTES
[facility]  FAMILY PRACTICE ACUTE OFFICE VISIT       NAME: Elis Varner  AGE: 45 y o  SEX: male       : 1980        MRN: [id number]    DATE: 2018  TIME: 4:14 PM    Assessment and Plan     Problem List Items Addressed This Visit     Current severe episode of 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 without prior episode ([address] ) - Primary     Reviewed with the patient that he has severe depression currently  He was started on Lexapro 10 mg  He will start with a half tablet daily for the 1st week and then increase to a whole tablet daily  Encouraged to follow up in 1 month  He was encouraged to follow-up with a therapist as well as he has already started to look into  He was also encouraged to call with any problems or concerns prior to his next visit  He was encouraged to check labs as ordered today as well to assess for any contributing medical source of his depression  Relevant Medications    escitalopram (LEXAPRO) 10 mg tablet    Other Relevant Orders    CBC and differential    Comprehensive metabolic panel    TSH, 3rd generation with Free T4 reflex          Current severe episode of 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 without prior episode [facility])  Reviewed with the patient that he has severe depression currently  He was started on Lexapro 10 mg  He will start with a half tablet daily for the 1st week and then increase to a whole tablet daily  Encouraged to follow up in 1 month  He was encouraged to follow-up with a therapist as well as he has already started to look into  He was also encouraged to call with any problems or concerns prior to his next visit  He was encouraged to check labs as ordered today as well to assess for any contributing medical source of his depression             Chief Complaint     Chief Complaint   Patient presents with    Anxiety       History of Present Illness   Elis Varner is a 45y o -year-old male who presents for anxiety  The patient presents today to discuss his anxiety and stress  He notes that he is having trouble, especially over the last month  He notes that he has previously had some difficulty dealing with stressful situations but has been able to handle it  He notes that he has multiple things going on situational a both at work and home that are causing him extra stress  He does not feel that he is able to handle this for now  He has episodes of crying for no reason and notes that he is finding it that his temper shorter  He notes that both his brother and parents have history of depression and take medication but he is unsure what medication  He has never taken medication before  Review of Systems   Review of Systems   Psychiatric/Behavioral: Positive for dysphoric mood  Negative for suicidal ideas (no suicidal thought - just a wish for an escape from current stressors - states that he would never hurt himself)  The patient is nervous/anxious  Active Problem List     Patient Active Problem List   Diagnosis    Essential (primary) hypertension    Situational anxiety    Abnormal LFTs    Hypokalemia    Current severe episode of 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 without prior episode ([facility] [address] )         Social History:  Social History     Social History    Marital status: /Civil Union     Spouse name: N/A    Number of children: N/A    Years of education: N/A     Occupational History    Not on file       Social History Main Topics    Smoking status: Current Every Day Smoker     Packs/day: 0 25    Smokeless tobacco: Never Used      Comment: tobacco use    Alcohol use Yes      Comment: Being a social drinker    Drug use: No    Sexual activity: Not on file     Other Topics Concern    Not on file     Social History Narrative    No narrative on file       Objective     Vitals:    11/06/18 1534   BP: 120/82   BP Location: Left arm   Patient Position: Sitting   Cuff Size: Standard   Weight: 86 9 kg (191 lb 8 oz)   Height: 5' 5" (1 651 m)     Wt Readings from Last 3 Encounters:   11/06/18 86 9 kg (191 lb 8 oz)   08/28/18 89 4 kg (197 lb 2 oz)   05/17/18 88 5 kg (195 lb)       Physical Exam   Constitutional: He appears well-developed and well-nourished  No distress  Neck: Normal range of motion  Neck supple  No thyromegaly present  Cardiovascular: Normal rate, regular rhythm, normal heart sounds and intact distal pulses  No murmur heard  Pulmonary/Chest: Effort normal and breath sounds normal  He has no wheezes  He has no rales  Lymphadenopathy:     He has no cervical adenopathy  Psychiatric:   Depressed and tearful   Vitals reviewed  ALLERGIES:  No Known Allergies    Current Medications     Current Outpatient Prescriptions   Medication Sig Dispense Refill    lisinopril (ZESTRIL) 10 mg tablet Take 1 tablet (10 mg total) by mouth daily 90 tablet 1    escitalopram (LEXAPRO) 10 mg tablet Take 1/2 tablet daily x 1 week and then increase to 1 tablet daily  30 tablet 1     No current facility-administered medications for this visit            Mary King PA-C  11/6/2018 4:14 PM  [facility]

## 2018-12-02 NOTE — PROGRESS NOTES
[facility]  FAMILY PRACTICE OFFICE VISIT       NAME: Liliane James  AGE: 45 y o  SEX: male       : 1980        MRN: [id number]    DATE: 2018  TIME: 8:56 AM    Assessment and Plan     Problem List Items Addressed This Visit     Essential (primary) hypertension     Stable  Continue lisinopril 10 mg daily  Relevant Medications    lisinopril (ZESTRIL) 10 mg tablet    Situational anxiety - Primary     Improved with Lexapro 10 mg daily  Consider seeing therapist  Follow-up in 3 months unless concerns in the interim  Relevant Medications    escitalopram (LEXAPRO) 10 mg tablet    Abnormal LFTs     Patient was reminded to go for blood work as ordered at last visit  Hypokalemia     Will be reassessed with labs as ordered at last visit  Patient reminded to have these performed  Current severe episode of 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 without prior episode ([facility] [address] )     Improved  Continue Lexapro 10 mg daily  Consider seeing therapist  Follow-up in 3 months  Call with any concerns in the interim  Relevant Medications    escitalopram (LEXAPRO) 10 mg tablet          Essential (primary) hypertension  Stable  Continue lisinopril 10 mg daily  Current severe episode of 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 without prior episode ([facility] [address] )  Improved  Continue Lexapro 10 mg daily  Consider seeing therapist  Follow-up in 3 months  Call with any concerns in the interim  Situational anxiety  Improved with Lexapro 10 mg daily  Consider seeing therapist  Follow-up in 3 months unless concerns in the interim  Abnormal LFTs  Patient was reminded to go for blood work as ordered at last visit  Hypokalemia  Will be reassessed with labs as ordered at last visit  Patient reminded to have these performed              Chief Complaint     Chief Complaint   Patient presents with    Follow-up     depression and anxiety History of Present Illness   Komal Carrasquillo is a 45y o -year-old male who presents for follow-up on depression and anxiety  The patient reports that recently anxiety/depression level has been improved  Daily medication includes Lexapro 10 mg daily (started at last visit)  Care team does not include a therapist/psychiatrist (was looking into at time of last visit but does not think he really needs this)  The patient denies SI/HI  Today's PHQ9 score was 3 (down from 25 at last visit)  (Labs not done as ordered at last visit )          Review of Systems   Review of Systems   Psychiatric/Behavioral: Positive for dysphoric mood  Negative for suicidal ideas  The patient is nervous/anxious  Active Problem List     Patient Active Problem List   Diagnosis    Essential (primary) hypertension    Situational anxiety    Abnormal LFTs    Hypokalemia    Current severe episode of 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 without prior episode ([address] )         Past Medical History:  Past Medical History:   Diagnosis Date    Abnormal LFTs     Rectal hemorrhage        Past Surgical History:  Past Surgical History:   Procedure Laterality Date    VASECTOMY      Vas Deferens       Family History:  Family History   Problem Relation Age of Onset    Hiatal hernia Mother     Hypertension Mother     Skin cancer Mother     Colon polyps Mother         Of the sigmoid colon    Hypertension Brother     Colon cancer Maternal Grandmother        Social History:  Social History     Social History    Marital status: /Civil Union     Spouse name: N/A    Number of children: N/A    Years of education: N/A     Occupational History    Not on file       Social History Main Topics    Smoking status: Current Every Day Smoker     Packs/day: 0 25    Smokeless tobacco: Never Used      Comment: tobacco use    Alcohol use Yes      Comment: Being a social drinker    Drug use: No    Sexual activity: Not on file     Other Topics Concern    Not on file     Social History Narrative    No narrative on file       Objective     Vitals:    12/07/18 0827   BP: 122/88   BP Location: Left arm   Patient Position: Sitting   Cuff Size: Standard   Pulse: 82   SpO2: 98%   Weight: 87 2 kg (192 lb 4 oz)   Height: 5' 5" (1 651 m)     Wt Readings from Last 3 Encounters:   12/07/18 87 2 kg (192 lb 4 oz)   11/06/18 86 9 kg (191 lb 8 oz)   08/28/18 89 4 kg (197 lb 2 oz)       Physical Exam   Constitutional: He appears well-developed and well-nourished  No distress  Neck: Normal range of motion  Neck supple  No thyromegaly present  Cardiovascular: Normal rate, normal heart sounds and intact distal pulses  No murmur heard  Pulmonary/Chest: Effort normal and breath sounds normal  He has no wheezes  He has no rales  Lymphadenopathy:     He has no cervical adenopathy  Psychiatric: He has a normal mood and affect  His behavior is normal    Vitals reviewed        Pertinent Laboratory/Diagnostic Studies:  Lab Results   Component Value Date    GLUCOSE 101 11/11/2014    BUN 13 08/27/2018    CREATININE 0 82 08/27/2018    CALCIUM 8 9 08/27/2018     11/11/2014    K 3 5 08/27/2018    CO2 28 08/27/2018     08/27/2018     Lab Results   Component Value Date    ALT 36 08/27/2018    AST 12 08/27/2018    ALKPHOS 83 08/27/2018    BILITOT 1 73 (H) 11/11/2014       Lab Results   Component Value Date    WBC 9 01 04/06/2018    HGB 16 0 04/06/2018    HCT 48 6 04/06/2018    MCV 92 04/06/2018     04/06/2018     Lab Results   Component Value Date    CHOL 165 11/11/2014     Lab Results   Component Value Date    TRIG 69 04/06/2018     Lab Results   Component Value Date    HDL 58 04/06/2018     Lab Results   Component Value Date    LDLCALC 96 04/06/2018     Results for orders placed or performed in visit on 08/27/18   Hepatic function panel   Result Value Ref Range    Total Bilirubin 0 98 0 20 - 1 00 mg/dL    Bilirubin, Direct 0 24 (H) 0 00 - 0 20 mg/dL    Alkaline Phosphatase 83 46 - 116 U/L    AST 12 5 - 45 U/L    ALT 36 12 - 78 U/L    Total Protein 7 2 6 4 - 8 2 g/dL    Albumin 4 0 3 5 - 5 0 g/dL   Basic metabolic panel   Result Value Ref Range    Sodium 140 136 - 145 mmol/L    Potassium 3 5 3 5 - 5 3 mmol/L    Chloride 103 100 - 108 mmol/L    CO2 28 21 - 32 mmol/L    ANION GAP 9 4 - 13 mmol/L    BUN 13 5 - 25 mg/dL    Creatinine 0 82 0 60 - 1 30 mg/dL    Glucose, Fasting 79 65 - 99 mg/dL    Calcium 8 9 8 3 - 10 1 mg/dL    eGFR 130 ml/min/1 73sq m           ALLERGIES:  No Known Allergies    Current Medications     Current Outpatient Prescriptions   Medication Sig Dispense Refill    escitalopram (LEXAPRO) 10 mg tablet Take 1 tablet (10 mg total) by mouth daily 90 tablet 1    lisinopril (ZESTRIL) 10 mg tablet Take 1 tablet (10 mg total) by mouth daily 90 tablet 1     No current facility-administered medications for this visit            Health Maintenance     Health Maintenance   Topic Date Due    Pneumococcal PPSV23 Medium Risk Adult (1 of 1 - PPSV23) 04/09/1999    DTaP,Tdap,and Td Vaccines (2 - Td) 08/03/2022    INFLUENZA VACCINE  Completed     Immunization History   Administered Date(s) Administered    Influenza Quadrivalent Preservative Free 3 years and older IM 11/11/2014, 10/29/2018    Tdap 08/03/2012       Christiane Alonso PA-C  12/7/2018 8:56 AM  [facility] [address]  [facility]

## 2018-12-07 ENCOUNTER — OFFICE VISIT (OUTPATIENT)
Dept: FAMILY MEDICINE CLINIC | Facility: CLINIC | Age: 38
End: 2018-12-07
Payer: COMMERCIAL

## 2018-12-07 ENCOUNTER — APPOINTMENT (OUTPATIENT)
Dept: LAB | Facility: CLINIC | Age: 38
End: 2018-12-07
Payer: COMMERCIAL

## 2018-12-07 VITALS
OXYGEN SATURATION: 98 % | DIASTOLIC BLOOD PRESSURE: 88 MMHG | HEART RATE: 82 BPM | HEIGHT: 65 IN | WEIGHT: 192.25 LBS | BODY MASS INDEX: 32.03 KG/M2 | SYSTOLIC BLOOD PRESSURE: 122 MMHG

## 2018-12-07 DIAGNOSIS — E87.6 HYPOKALEMIA: ICD-10-CM

## 2018-12-07 DIAGNOSIS — F32.2 CURRENT SEVERE EPISODE OF MAJOR DEPRESSIVE DISORDER WITHOUT PSYCHOTIC FEATURES WITHOUT PRIOR EPISODE (HCC): ICD-10-CM

## 2018-12-07 DIAGNOSIS — R79.89 ABNORMAL LFTS: ICD-10-CM

## 2018-12-07 DIAGNOSIS — F41.8 SITUATIONAL ANXIETY: Primary | ICD-10-CM

## 2018-12-07 DIAGNOSIS — I10 ESSENTIAL (PRIMARY) HYPERTENSION: ICD-10-CM

## 2018-12-07 LAB
ALBUMIN SERPL BCP-MCNC: 4.4 G/DL (ref 3.5–5)
ALP SERPL-CCNC: 97 U/L (ref 46–116)
ALT SERPL W P-5'-P-CCNC: 57 U/L (ref 12–78)
ANION GAP SERPL CALCULATED.3IONS-SCNC: 5 MMOL/L (ref 4–13)
AST SERPL W P-5'-P-CCNC: 19 U/L (ref 5–45)
BASOPHILS # BLD AUTO: 0.03 THOUSANDS/ΜL (ref 0–0.1)
BASOPHILS NFR BLD AUTO: 0 % (ref 0–1)
BILIRUB SERPL-MCNC: 1.33 MG/DL (ref 0.2–1)
BUN SERPL-MCNC: 7 MG/DL (ref 5–25)
CALCIUM SERPL-MCNC: 9.7 MG/DL (ref 8.3–10.1)
CHLORIDE SERPL-SCNC: 100 MMOL/L (ref 100–108)
CO2 SERPL-SCNC: 28 MMOL/L (ref 21–32)
CREAT SERPL-MCNC: 0.85 MG/DL (ref 0.6–1.3)
EOSINOPHIL # BLD AUTO: 0.09 THOUSAND/ΜL (ref 0–0.61)
EOSINOPHIL NFR BLD AUTO: 1 % (ref 0–6)
ERYTHROCYTE [DISTWIDTH] IN BLOOD BY AUTOMATED COUNT: 12.1 % (ref 11.6–15.1)
GFR SERPL CREATININE-BSD FRML MDRD: 128 ML/MIN/1.73SQ M
GLUCOSE P FAST SERPL-MCNC: 88 MG/DL (ref 65–99)
HCT VFR BLD AUTO: 50.6 % (ref 36.5–49.3)
HGB BLD-MCNC: 16.4 G/DL (ref 12–17)
IMM GRANULOCYTES # BLD AUTO: 0.03 THOUSAND/UL (ref 0–0.2)
IMM GRANULOCYTES NFR BLD AUTO: 0 % (ref 0–2)
LYMPHOCYTES # BLD AUTO: 2.81 THOUSANDS/ΜL (ref 0.6–4.47)
LYMPHOCYTES NFR BLD AUTO: 30 % (ref 14–44)
MCH RBC QN AUTO: 30.1 PG (ref 26.8–34.3)
MCHC RBC AUTO-ENTMCNC: 32.4 G/DL (ref 31.4–37.4)
MCV RBC AUTO: 93 FL (ref 82–98)
MONOCYTES # BLD AUTO: 0.54 THOUSAND/ΜL (ref 0.17–1.22)
MONOCYTES NFR BLD AUTO: 6 % (ref 4–12)
NEUTROPHILS # BLD AUTO: 6 THOUSANDS/ΜL (ref 1.85–7.62)
NEUTS SEG NFR BLD AUTO: 63 % (ref 43–75)
NRBC BLD AUTO-RTO: 0 /100 WBCS
PLATELET # BLD AUTO: 310 THOUSANDS/UL (ref 149–390)
PMV BLD AUTO: 9.7 FL (ref 8.9–12.7)
POTASSIUM SERPL-SCNC: 3.7 MMOL/L (ref 3.5–5.3)
PROT SERPL-MCNC: 8 G/DL (ref 6.4–8.2)
RBC # BLD AUTO: 5.44 MILLION/UL (ref 3.88–5.62)
SODIUM SERPL-SCNC: 133 MMOL/L (ref 136–145)
TSH SERPL DL<=0.05 MIU/L-ACNC: 1.2 UIU/ML (ref 0.36–3.74)
WBC # BLD AUTO: 9.5 THOUSAND/UL (ref 4.31–10.16)

## 2018-12-07 PROCEDURE — 3008F BODY MASS INDEX DOCD: CPT | Performed by: PHYSICIAN ASSISTANT

## 2018-12-07 PROCEDURE — 99214 OFFICE O/P EST MOD 30 MIN: CPT | Performed by: PHYSICIAN ASSISTANT

## 2018-12-07 PROCEDURE — 80053 COMPREHEN METABOLIC PANEL: CPT | Performed by: PHYSICIAN ASSISTANT

## 2018-12-07 PROCEDURE — 85025 COMPLETE CBC W/AUTO DIFF WBC: CPT | Performed by: PHYSICIAN ASSISTANT

## 2018-12-07 PROCEDURE — 84443 ASSAY THYROID STIM HORMONE: CPT | Performed by: PHYSICIAN ASSISTANT

## 2018-12-07 PROCEDURE — 3079F DIAST BP 80-89 MM HG: CPT | Performed by: PHYSICIAN ASSISTANT

## 2018-12-07 PROCEDURE — 36415 COLL VENOUS BLD VENIPUNCTURE: CPT | Performed by: PHYSICIAN ASSISTANT

## 2018-12-07 PROCEDURE — 3074F SYST BP LT 130 MM HG: CPT | Performed by: PHYSICIAN ASSISTANT

## 2018-12-07 RX ORDER — LISINOPRIL 10 MG/1
10 TABLET ORAL DAILY
Qty: 90 TABLET | Refills: 1 | Status: SHIPPED | OUTPATIENT
Start: 2018-12-07 | End: 2019-04-11 | Stop reason: SDUPTHER

## 2018-12-07 RX ORDER — ESCITALOPRAM OXALATE 10 MG/1
10 TABLET ORAL DAILY
Qty: 90 TABLET | Refills: 1 | Status: SHIPPED | OUTPATIENT
Start: 2018-12-07 | End: 2019-04-11 | Stop reason: SDUPTHER

## 2018-12-07 NOTE — ASSESSMENT & PLAN NOTE
Improved  Continue Lexapro 10 mg daily  Consider seeing therapist  Follow-up in 3 months  Call with any concerns in the interim

## 2018-12-07 NOTE — ASSESSMENT & PLAN NOTE
Improved with Lexapro 10 mg daily  Consider seeing therapist  Follow-up in 3 months unless concerns in the interim

## 2018-12-07 NOTE — PATIENT INSTRUCTIONS
Essential (primary) hypertension  Stable  Continue lisinopril 10 mg daily  Current severe episode of 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 without prior episode ([facility] [address] )  Improved  Continue Lexapro 10 mg daily  Consider seeing therapist  Follow-up in 3 months  Call with any concerns in the interim  Situational anxiety  Improved with Lexapro 10 mg daily  Consider seeing therapist  Follow-up in 3 months unless concerns in the interim

## 2018-12-10 ENCOUNTER — TELEPHONE (OUTPATIENT)
Dept: FAMILY MEDICINE CLINIC | Facility: CLINIC | Age: 38
End: 2018-12-10

## 2018-12-10 DIAGNOSIS — E87.1 LOW SODIUM LEVELS: Primary | ICD-10-CM

## 2018-12-11 ENCOUNTER — TELEPHONE (OUTPATIENT)
Dept: FAMILY MEDICINE CLINIC | Facility: CLINIC | Age: 38
End: 2018-12-11

## 2019-04-11 ENCOUNTER — TRANSCRIBE ORDERS (OUTPATIENT)
Dept: LAB | Facility: CLINIC | Age: 39
End: 2019-04-11

## 2019-04-11 ENCOUNTER — OFFICE VISIT (OUTPATIENT)
Dept: FAMILY MEDICINE CLINIC | Facility: CLINIC | Age: 39
End: 2019-04-11
Payer: COMMERCIAL

## 2019-04-11 ENCOUNTER — APPOINTMENT (OUTPATIENT)
Dept: LAB | Facility: CLINIC | Age: 39
End: 2019-04-11
Payer: COMMERCIAL

## 2019-04-11 VITALS
SYSTOLIC BLOOD PRESSURE: 118 MMHG | OXYGEN SATURATION: 97 % | DIASTOLIC BLOOD PRESSURE: 80 MMHG | BODY MASS INDEX: 31.86 KG/M2 | WEIGHT: 198.25 LBS | HEIGHT: 66 IN | HEART RATE: 88 BPM

## 2019-04-11 DIAGNOSIS — I10 ESSENTIAL (PRIMARY) HYPERTENSION: ICD-10-CM

## 2019-04-11 DIAGNOSIS — Z72.0 TOBACCO USE: ICD-10-CM

## 2019-04-11 DIAGNOSIS — Z00.00 ANNUAL PHYSICAL EXAM: Primary | ICD-10-CM

## 2019-04-11 DIAGNOSIS — E87.1 LOW SODIUM LEVELS: ICD-10-CM

## 2019-04-11 DIAGNOSIS — F32.2 CURRENT SEVERE EPISODE OF MAJOR DEPRESSIVE DISORDER WITHOUT PSYCHOTIC FEATURES WITHOUT PRIOR EPISODE (HCC): ICD-10-CM

## 2019-04-11 DIAGNOSIS — H61.22 IMPACTED CERUMEN OF LEFT EAR: ICD-10-CM

## 2019-04-11 LAB
ANION GAP SERPL CALCULATED.3IONS-SCNC: 9 MMOL/L (ref 4–13)
BUN SERPL-MCNC: 14 MG/DL (ref 5–25)
CALCIUM SERPL-MCNC: 9.3 MG/DL (ref 8.3–10.1)
CHLORIDE SERPL-SCNC: 103 MMOL/L (ref 100–108)
CO2 SERPL-SCNC: 28 MMOL/L (ref 21–32)
CREAT SERPL-MCNC: 0.97 MG/DL (ref 0.6–1.3)
GFR SERPL CREATININE-BSD FRML MDRD: 113 ML/MIN/1.73SQ M
GLUCOSE P FAST SERPL-MCNC: 93 MG/DL (ref 65–99)
POTASSIUM SERPL-SCNC: 3.9 MMOL/L (ref 3.5–5.3)
SODIUM SERPL-SCNC: 140 MMOL/L (ref 136–145)

## 2019-04-11 PROCEDURE — 36415 COLL VENOUS BLD VENIPUNCTURE: CPT

## 2019-04-11 PROCEDURE — 80048 BASIC METABOLIC PNL TOTAL CA: CPT

## 2019-04-11 PROCEDURE — 99395 PREV VISIT EST AGE 18-39: CPT | Performed by: PHYSICIAN ASSISTANT

## 2019-04-11 RX ORDER — ESCITALOPRAM OXALATE 10 MG/1
10 TABLET ORAL DAILY
Qty: 90 TABLET | Refills: 1 | Status: SHIPPED | OUTPATIENT
Start: 2019-04-11 | End: 2019-09-23 | Stop reason: SDUPTHER

## 2019-04-11 RX ORDER — LISINOPRIL 10 MG/1
10 TABLET ORAL DAILY
Qty: 90 TABLET | Refills: 1 | Status: SHIPPED | OUTPATIENT
Start: 2019-04-11 | End: 2020-02-27 | Stop reason: SDUPTHER

## 2019-04-12 PROBLEM — Z72.0 TOBACCO USE: Status: ACTIVE | Noted: 2019-04-12

## 2019-04-12 PROBLEM — E87.6 HYPOKALEMIA: Status: RESOLVED | Noted: 2018-05-17 | Resolved: 2019-04-12

## 2019-04-12 PROCEDURE — 69209 REMOVE IMPACTED EAR WAX UNI: CPT | Performed by: PHYSICIAN ASSISTANT

## 2019-09-23 DIAGNOSIS — F32.2 CURRENT SEVERE EPISODE OF MAJOR DEPRESSIVE DISORDER WITHOUT PSYCHOTIC FEATURES WITHOUT PRIOR EPISODE (HCC): ICD-10-CM

## 2019-09-23 RX ORDER — ESCITALOPRAM OXALATE 10 MG/1
10 TABLET ORAL DAILY
Qty: 90 TABLET | Refills: 0 | Status: SHIPPED | OUTPATIENT
Start: 2019-09-23 | End: 2019-09-24 | Stop reason: SDUPTHER

## 2019-09-24 DIAGNOSIS — F32.2 CURRENT SEVERE EPISODE OF MAJOR DEPRESSIVE DISORDER WITHOUT PSYCHOTIC FEATURES WITHOUT PRIOR EPISODE (HCC): ICD-10-CM

## 2019-09-25 RX ORDER — ESCITALOPRAM OXALATE 10 MG/1
10 TABLET ORAL DAILY
Qty: 90 TABLET | Refills: 1 | Status: SHIPPED | OUTPATIENT
Start: 2019-09-25 | End: 2020-01-31

## 2020-01-31 DIAGNOSIS — F32.2 CURRENT SEVERE EPISODE OF MAJOR DEPRESSIVE DISORDER WITHOUT PSYCHOTIC FEATURES WITHOUT PRIOR EPISODE (HCC): ICD-10-CM

## 2020-01-31 RX ORDER — ESCITALOPRAM OXALATE 10 MG/1
TABLET ORAL
Qty: 30 TABLET | Refills: 1 | Status: SHIPPED | OUTPATIENT
Start: 2020-01-31 | End: 2020-02-27 | Stop reason: SDUPTHER

## 2020-02-04 ENCOUNTER — TELEPHONE (OUTPATIENT)
Dept: FAMILY MEDICINE CLINIC | Facility: CLINIC | Age: 40
End: 2020-02-04

## 2020-02-26 NOTE — PROGRESS NOTES
FAMILY PRACTICE OFFICE VISIT  [facility] - [address] [facility] PRIMARY CARE       NAME: Rabia Perez  AGE: 44 y o  SEX: male       : 1980        MRN: [id number]    DATE: 2020  TIME: 9:09 AM    Assessment and Plan     Problem List Items Addressed This Visit        Cardiovascular and Mediastinum    Essential (primary) hypertension - Primary     Uncontrolled  Lisinopril was increased to 20 mg daily today  He will follow-up in about a month and a half for physical and re-evaluation of his blood pressure  He was encouraged to limit intake of caffeine and salt  He should call with any problems or concerns prior to his next visit  Relevant Medications    lisinopril (ZESTRIL) 20 mg tablet    Other Relevant Orders    CBC and differential    Comprehensive metabolic panel    TSH, 3rd generation with Free T4 reflex       Other    Situational anxiety     Well controlled  Continue Lexapro 10 mg daily  Will continue to monitor  Abnormal LFTs     We will reassess with labs as ordered today  Relevant Orders    Comprehensive metabolic panel    Current severe episode of 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 without prior episode ([address] )     Well controlled  Continue Lexapro 10 mg daily  Call with any problems or concerns           Relevant Medications    escitalopram (LEXAPRO) 10 mg tablet    Other Relevant Orders    CBC and differential    TSH, 3rd generation with Free T4 reflex      Other Visit Diagnoses     Screening for HIV (human immunodeficiency virus)        Relevant Orders    Human Immunodeficiency Virus 1/2 Antigen / Antibody ( Fourth Generation) with Reflex Testing    Diabetes mellitus screening        Relevant Orders    Comprehensive metabolic panel    Lipid screening        Relevant Orders    Lipid Panel with Direct LDL reflex      The USPSTF recommendation for HIV screening in all patients between 13and 72years old (once in lifetime or annually with risk factors) was discussed with the patient  The patient agreed to testing  Essential (primary) hypertension  Uncontrolled  Lisinopril was increased to 20 mg daily today  He will follow-up in about a month and a half for physical and re-evaluation of his blood pressure  He was encouraged to limit intake of caffeine and salt  He should call with any problems or concerns prior to his next visit  Abnormal LFTs  We will reassess with labs as ordered today  Current severe episode of 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 without prior episode ([address] )  Well controlled  Continue Lexapro 10 mg daily  Call with any problems or concerns  Situational anxiety  Well controlled  Continue Lexapro 10 mg daily  Will continue to monitor  Chief Complaint     Chief Complaint   Patient presents with    Follow-up     depression        History of Present Illness   Karyle Lanes is a 44y o -year-old male who presents for follow-up on chronic medical problems  The patient reports that current blood pressure medications include lisinopril 10 mg daily  Blood pressure readings at home are not checked  The patient denies symptoms of poor control such as chest pain, shortness of breath, leg swelling, vision changes, or dizziness, but notes that he has rare headaches if forgets the pill  The patient reports <1 cup of coffee daily caffeine intake and unlimited salt intake  The patient reports that recently depression level has been well-controlled  Daily medication includes Lexapro 10 mg daily  Care team does not include a therapist/psychiatrist  The patient denies SI/HI  Today's PHQ9 score was 3  Review of Systems   Review of Systems   Respiratory: Negative for shortness of breath  Cardiovascular: Negative for chest pain, palpitations and leg swelling  Neurological: Positive for headaches (1-2 times per month if forgets his medication)   Negative for dizziness  Psychiatric/Behavioral: Negative for dysphoric mood and suicidal ideas         Active Problem List     Patient Active Problem List   Diagnosis    Essential (primary) hypertension    Situational anxiety    Abnormal LFTs    Current severe episode of 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 without prior episode ([facility] [address] )    Tobacco use         Past Medical History:  Past Medical History:   Diagnosis Date    Abnormal LFTs     Rectal hemorrhage        Past Surgical History:  Past Surgical History:   Procedure Laterality Date    VASECTOMY      Vas Deferens       Family History:  Family History   Problem Relation Age of Onset    Hiatal hernia Mother     Hypertension Mother     Skin cancer Mother         melanoma    Colon polyps Mother         Of the sigmoid colon    Hypertension Brother     Colon cancer Maternal Grandmother        Social History:  Social History     Socioeconomic History    Marital status: /Civil Union     Spouse name: Not on file    Number of children: Not on file    Years of education: Not on file    Highest education level: Not on file   Occupational History    Not on file   Social Needs    Financial resource strain: Not on file    Food insecurity:     Worry: Not on file     Inability: Not on file    Transportation needs:     Medical: Not on file     Non-medical: Not on file   Tobacco Use    Smoking status: Current Every Day Smoker     Packs/day: 0 50     Years: 21 00     Pack years: 10 50    Smokeless tobacco: Never Used    Tobacco comment: tobacco use - about a pack a week - has cut from 2 ppd almost when was depressed   Substance and Sexual Activity    Alcohol use: Yes     Comment: Being a social drinker    Drug use: No    Sexual activity: Not on file   Lifestyle    Physical activity:     Days per week: Not on file     Minutes per session: Not on file    Stress: Not on file   Relationships    Social connections:     Talks on phone: Not on file     Gets together: Not on file     Attends Shinto service: Not on file     Active member of club or organization: Not on file     Attends meetings of clubs or organizations: Not on file     Relationship status: Not on file    Intimate partner violence:     Fear of current or ex partner: Not on file     Emotionally abused: Not on file     Physically abused: Not on file     Forced sexual activity: Not on file   Other Topics Concern    Not on file   Social History Narrative    Not on file       Objective     Vitals:    02/27/20 0840 02/27/20 0907   BP: 120/90 126/92   BP Location: Left arm    Patient Position: Sitting    Cuff Size: Large    Pulse: 82    SpO2: 99%    Weight: 92 7 kg (204 lb 6 oz)    Height: 5' 5 9" (1 674 m)      Wt Readings from Last 3 Encounters:   02/27/20 92 7 kg (204 lb 6 oz)   04/11/19 89 9 kg (198 lb 4 oz)   12/07/18 87 2 kg (192 lb 4 oz)       Physical Exam   Constitutional: He appears well-developed and well-nourished  No distress  HENT:   Head: Normocephalic and atraumatic  Neck: Neck supple  No thyromegaly present  Cardiovascular: Normal rate, regular rhythm, normal heart sounds and intact distal pulses  No murmur heard  No carotid bruits noted   Pulmonary/Chest: Effort normal and breath sounds normal  He has no wheezes  He has no rales  Musculoskeletal: He exhibits no edema  Lymphadenopathy:     He has no cervical adenopathy  Neurological: He is alert  Psychiatric: He has a normal mood and affect  Vitals reviewed        Pertinent Laboratory/Diagnostic Studies:  Lab Results   Component Value Date    GLUCOSE 101 11/11/2014    BUN 14 04/11/2019    CREATININE 0 97 04/11/2019    CALCIUM 9 3 04/11/2019     11/11/2014    K 3 9 04/11/2019    CO2 28 04/11/2019     04/11/2019     Lab Results   Component Value Date    ALT 57 12/07/2018    AST 19 12/07/2018    ALKPHOS 97 12/07/2018    BILITOT 1 73 (H) 11/11/2014       Lab Results   Component Value Date    WBC 9 50 12/07/2018    HGB 16 4 12/07/2018    HCT 50 6 (H) 12/07/2018    MCV 93 12/07/2018     12/07/2018     Lab Results   Component Value Date    CHOL 165 11/11/2014     Lab Results   Component Value Date    TRIG 69 04/06/2018     Lab Results   Component Value Date    HDL 58 04/06/2018     Lab Results   Component Value Date    LDLCALC 96 04/06/2018     Results for orders placed or performed in visit on 15/02/13   Basic metabolic panel   Result Value Ref Range    Sodium 140 136 - 145 mmol/L    Potassium 3 9 3 5 - 5 3 mmol/L    Chloride 103 100 - 108 mmol/L    CO2 28 21 - 32 mmol/L    ANION GAP 9 4 - 13 mmol/L    BUN 14 5 - 25 mg/dL    Creatinine 0 97 0 60 - 1 30 mg/dL    Glucose, Fasting 93 65 - 99 mg/dL    Calcium 9 3 8 3 - 10 1 mg/dL    eGFR 113 ml/min/1 73sq m       Orders Placed This Encounter   Procedures    CBC and differential    Lipid Panel with Direct LDL reflex    Comprehensive metabolic panel    TSH, 3rd generation with Free T4 reflex    Human Immunodeficiency Virus 1/2 Antigen / Antibody ( Fourth Generation) with Reflex Testing       ALLERGIES:  No Known Allergies    Current Medications     Current Outpatient Medications   Medication Sig Dispense Refill    escitalopram (LEXAPRO) 10 mg tablet Take 1 tablet (10 mg total) by mouth daily 90 tablet 1    lisinopril (ZESTRIL) 20 mg tablet Take 1 tablet (20 mg total) by mouth daily 90 tablet 1     No current facility-administered medications for this visit            Health Maintenance     Health Maintenance   Topic Date Due    Pneumococcal Vaccine: Pediatrics (0 to 5 Years) and At-Risk Patients (6 to 59 Years) (1 of 1 - PPSV23) 04/09/1986    HIV Screening  04/09/1995    Annual Physical  04/11/2020    BMI: Adult  04/11/2020    BMI: Followup Plan  04/12/2020    DTaP,Tdap,and Td Vaccines (2 - Td) 08/03/2022    Pneumococcal Vaccine: 65+ Years (1 of 2 - PCV13) 04/09/2045    Influenza Vaccine  Completed    HIB Vaccine  Aged Out    Hepatitis B Vaccine  Aged Out    IPV Vaccine  Aged Out    Hepatitis A Vaccine  Aged Out    Meningococcal ACWY Vaccine  Aged Out    HPV Vaccine  Aged Out     Immunization History   Administered Date(s) Administered    INFLUENZA 10/01/2019    Influenza Quadrivalent Preservative Free 3 years and older IM 11/11/2014, 10/29/2018    Tdap 08/03/2012       Gus Del Rio PA-C  2/27/2020 9:09 AM  [facility]

## 2020-02-27 ENCOUNTER — OFFICE VISIT (OUTPATIENT)
Dept: FAMILY MEDICINE CLINIC | Facility: CLINIC | Age: 40
End: 2020-02-27
Payer: COMMERCIAL

## 2020-02-27 VITALS
WEIGHT: 204.38 LBS | DIASTOLIC BLOOD PRESSURE: 92 MMHG | SYSTOLIC BLOOD PRESSURE: 126 MMHG | BODY MASS INDEX: 32.85 KG/M2 | HEART RATE: 82 BPM | HEIGHT: 66 IN | OXYGEN SATURATION: 99 %

## 2020-02-27 DIAGNOSIS — I10 ESSENTIAL (PRIMARY) HYPERTENSION: Primary | ICD-10-CM

## 2020-02-27 DIAGNOSIS — Z13.220 LIPID SCREENING: ICD-10-CM

## 2020-02-27 DIAGNOSIS — R79.89 ABNORMAL LFTS: ICD-10-CM

## 2020-02-27 DIAGNOSIS — F32.2 CURRENT SEVERE EPISODE OF MAJOR DEPRESSIVE DISORDER WITHOUT PSYCHOTIC FEATURES WITHOUT PRIOR EPISODE (HCC): ICD-10-CM

## 2020-02-27 DIAGNOSIS — F41.8 SITUATIONAL ANXIETY: ICD-10-CM

## 2020-02-27 DIAGNOSIS — Z13.1 DIABETES MELLITUS SCREENING: ICD-10-CM

## 2020-02-27 DIAGNOSIS — Z11.4 SCREENING FOR HIV (HUMAN IMMUNODEFICIENCY VIRUS): ICD-10-CM

## 2020-02-27 PROCEDURE — 4004F PT TOBACCO SCREEN RCVD TLK: CPT | Performed by: PHYSICIAN ASSISTANT

## 2020-02-27 PROCEDURE — 3008F BODY MASS INDEX DOCD: CPT | Performed by: PHYSICIAN ASSISTANT

## 2020-02-27 PROCEDURE — 3074F SYST BP LT 130 MM HG: CPT | Performed by: PHYSICIAN ASSISTANT

## 2020-02-27 PROCEDURE — 3080F DIAST BP >= 90 MM HG: CPT | Performed by: PHYSICIAN ASSISTANT

## 2020-02-27 PROCEDURE — 99214 OFFICE O/P EST MOD 30 MIN: CPT | Performed by: PHYSICIAN ASSISTANT

## 2020-02-27 RX ORDER — ESCITALOPRAM OXALATE 10 MG/1
10 TABLET ORAL DAILY
Qty: 90 TABLET | Refills: 1 | Status: SHIPPED | OUTPATIENT
Start: 2020-02-27 | End: 2021-04-28

## 2020-02-27 RX ORDER — LISINOPRIL 20 MG/1
20 TABLET ORAL DAILY
Qty: 90 TABLET | Refills: 1 | Status: SHIPPED | OUTPATIENT
Start: 2020-02-27 | End: 2021-04-28 | Stop reason: SDUPTHER

## 2020-02-27 NOTE — ASSESSMENT & PLAN NOTE
Uncontrolled  Lisinopril was increased to 20 mg daily today  He will follow-up in about a month and a half for physical and re-evaluation of his blood pressure  He was encouraged to limit intake of caffeine and salt  He should call with any problems or concerns prior to his next visit

## 2021-04-25 NOTE — PROGRESS NOTES
ADULT ANNUAL [facility] [address] [facility]    NAME: Adarsh Michel  AGE: 39 y o  SEX: male  : 1980     DATE: 2021     Assessment and Plan:     Problem List Items Addressed This Visit        Cardiovascular and Mediastinum    Essential (primary) hypertension     Mildly uncontrolled today given his inconsistent use of medication  He will restart using lisinopril 20 mg daily and follow-up in 1 month  Relevant Medications    lisinopril (ZESTRIL) 20 mg tablet    Other Relevant Orders    CBC and differential    Comprehensive metabolic panel    Lipid Panel with Direct LDL reflex    TSH, 3rd generation with Free T4 reflex       Other    History of anxiety       Doing well without medication  He will remain off of Lexapro  Encouraged to call if symptoms recur  Tobacco use       Patient was encouraged to quit smoking  He states that he will get patches over-the-counter  He will call with any problems or concerns  Tobacco Cessation Counseling: Tobacco cessation counseling and education was provided  The patient is sincerely urged to quit consumption of tobacco  He is ready to quit tobacco  The numerous health risks of tobacco consumption were discussed  HE will get patches and check resources available through work              Chronic pain of right ankle     Likely tendonitis  Check right ankle xray to rule out bony abnormality  Try home ankle exercises  F/u with Podiatry if not improving  Relevant Orders    Ambulatory referral to Podiatry    XR ankle 3+ vw right    Frequent bowel movements     Accompanied by bloating and triggered often by food  Check celiac panel and food allergy panel  If persists and results non-contributory, follow-up with GI  Relevant Orders    Celiac Disease Antibody Profile    Food Allergy Profile    Obesity (BMI 30 0-34  9)     BMI Counseling: Body mass index is 34 91 kg/m²   The BMI is above normal  Nutrition recommendations include decreasing overall calorie intake, 3-5 servings of fruits/vegetables daily and consuming healthier snacks  Exercise recommendations include moderate aerobic physical activity for 150 minutes/week and exercising 3-5 times per week  Relevant Orders    CBC and differential    Comprehensive metabolic panel    Lipid Panel with Direct LDL reflex    TSH, 3rd generation with Free T4 reflex      Other Visit Diagnoses     Annual physical exam    -  Primary    Screening for HIV (human immunodeficiency virus)        Relevant Orders    Human Immunodeficiency Virus 1/2 Antigen / Antibody ( Fourth Generation) with Reflex Testing      The USPSTF recommendation for HIV screening in all patients between 13and 72years old (once in lifetime or annually with risk factors) was discussed with the patient  The patient agreed to testing  Immunizations and preventive care screenings were discussed with patient today  Appropriate education was printed on patient's after visit summary  Counseling:  · Exercise: the importance of regular exercise/physical activity was discussed  Recommend exercise 3-5 times per week for at least 30 minutes  Return in about 1 month (around 5/28/2021) for Recheck  Chief Complaint:     Chief Complaint   Patient presents with    Physical Exam      History of Present Illness:     Adult Annual Physical   Patient here for a comprehensive physical exam  The patient reports problems - as below  The patient reports that current blood pressure medications include lisinopril 20 mg daily (increased last visit) - notes that he has not been taking it more than 2-3 times weekly  The patient denies symptoms of poor control such as chest pain, shortness of breath, leg swelling, vision changes, headaches, or dizziness  The patient reports that recently anxiety/depression level has been resolved    Daily medication no longer includes Lexapro 10 mg daily  Today's PHQ2 score was 0  He previously had elevated LFTs and most recent labs (from 2018) show normal LFTs  Diet and Physical Activity  · Diet/Nutrition: poor diet and limited fruits/vegetables  · Exercise: no formal exercise  Depression Screening  PHQ-9 Depression Screening    PHQ-9:   Frequency of the following problems over the past two weeks:      Little interest or pleasure in doing things: 0 - not at all  Feeling down, depressed, or hopeless: 0 - not at all  Trouble falling or staying asleep, or sleeping too much: 0 - not at all  Feeling tired or having little energy: 0 - not at all  Poor appetite or overeatin - not at all  Feeling bad about yourself - or that you are a failure or have let yourself or your family down: 0 - not at all  Trouble concentrating on things, such as reading the newspaper or watching television: 0 - not at all  Moving or speaking so slowly that other people could have noticed  Or the opposite - being so fidgety or restless that you have been moving around a lot more than usual: 0 - not at all  Thoughts that you would be better off dead, or of hurting yourself in some way: 0 - not at all  PHQ-2 Score: 0  PHQ-9 Score: 0       General Health  · 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 and averages 5-8 hours a day total    · Hearing: normal - bilateral   · Vision: most recent eye exam >1 year ago  · Dental: regular dental visits   Health  · Symptoms include: none     Review of Systems:     Review of Systems   Constitutional: Negative for chills and fever  HENT: Negative for rhinorrhea and sore throat  Eyes: Negative for visual disturbance  Respiratory: Negative for cough, shortness of breath and wheezing  Cardiovascular: Negative for chest pain, palpitations and leg swelling  Gastrointestinal: Negative for abdominal pain, constipation, diarrhea, nausea and vomiting          BM with everyday about 3-5+ times a day - has bloating as well   Endocrine: Negative for polydipsia and polyuria  Genitourinary: Negative for dysuria and frequency  Musculoskeletal: Positive for arthralgias (knees, ankles and back since weight gain this year)  Negative for myalgias  Foot pain from plantar fasciitis - despite ice and stretches   Skin: Negative for rash  Neurological: Negative for dizziness and headaches  Hematological: Does not bruise/bleed easily  Psychiatric/Behavioral: Negative for dysphoric mood  The patient is not nervous/anxious         Past Medical History:     Past Medical History:   Diagnosis Date    Abnormal LFTs     Current severe episode of 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 [facility] 11/6/[facility]    Rectal hemorrhage       Past Surgical History:     Past Surgical History:   Procedure Laterality Date    VASECTOMY      Vas Deferens      Family History:     Family History   Problem Relation Age of Onset    Hiatal hernia Mother     Hypertension Mother     Skin cancer Mother         melanoma    Colon polyps Mother         Of the sigmoid colon    Diabetes Father     Hypertension Brother     Colon cancer Maternal Grandmother     Diabetes Maternal Uncle       Social History:        Social History     Socioeconomic History    Marital status: /Civil Union     Spouse name: None    Number of children: None    Years of education: None    Highest education level: None   Occupational History    None   Social Needs    Financial resource strain: None    Food insecurity     Worry: None     Inability: None    Transportation needs     Medical: None     Non-medical: None   Tobacco Use    Smoking status: Current Every Day Smoker     Packs/day: 0 50     Years: 25 00     Pack years: 12 50    Smokeless tobacco: Never Used    Tobacco comment: tobacco use - has cut from 2 ppd almost when was depressed   Substance and Sexual Activity    Alcohol use: Yes     Frequency: 2-4 times a month     Drinks per session: 5 or 6     Binge frequency: Weekly     Comment: Being a social drinker    Drug use: No    Sexual activity: None   Lifestyle    Physical activity     Days per week: None     Minutes per session: None    Stress: None   Relationships    Social connections     Talks on phone: None     Gets together: None     Attends Confucianism service: None     Active member of club or organization: None     Attends meetings of clubs or organizations: None     Relationship status: None    Intimate partner violence     Fear of current or ex partner: None     Emotionally abused: None     Physically abused: None     Forced sexual activity: None   Other Topics Concern    None   Social History Narrative    None      Current Medications:     Current Outpatient Medications   Medication Sig Dispense Refill    lisinopril (ZESTRIL) 20 mg tablet Take 1 tablet (20 mg total) by mouth daily 90 tablet 1     No current facility-administered medications for this visit  Allergies:     No Known Allergies   Physical Exam:     /92   Pulse 96   Temp 98 2 °F (36 8 °C) (Temporal)   Ht 5' 5 5" (1 664 m)   Wt 96 6 kg (213 lb)   SpO2 98%   BMI 34 91 kg/m²     Physical Exam  Vitals signs reviewed  Constitutional:       General: He is not in acute distress  Appearance: Normal appearance  He is well-developed  He is obese  He is not ill-appearing  HENT:      Head: Normocephalic and atraumatic  Right Ear: External ear normal       Left Ear: External ear normal    Eyes:      Conjunctiva/sclera: Conjunctivae normal       Pupils: Pupils are equal, round, and reactive to light  Neck:      Musculoskeletal: Normal range of motion and neck supple  Thyroid: No thyromegaly  Cardiovascular:      Rate and Rhythm: Normal rate and regular rhythm  Pulses: Normal pulses  Heart sounds: Normal heart sounds  No murmur     Pulmonary:      Effort: Pulmonary effort is normal       Breath sounds: Normal breath sounds  No wheezing, rhonchi or rales  Abdominal:      General: Bowel sounds are normal  There is no distension  Palpations: Abdomen is soft  There is no mass  Tenderness: There is no abdominal tenderness  Musculoskeletal:      Right lower leg: No edema  Left lower leg: No edema  Lymphadenopathy:      Cervical: No cervical adenopathy  Skin:     General: Skin is warm and dry  Findings: No rash  Neurological:      Mental Status: He is alert  Sensory: No sensory deficit     Psychiatric:         Mood and Affect: Mood normal          Behavior: Behavior normal           Ava Ozuna PA-C  [facility]

## 2021-04-28 ENCOUNTER — OFFICE VISIT (OUTPATIENT)
Dept: FAMILY MEDICINE CLINIC | Facility: CLINIC | Age: 41
End: 2021-04-28
Payer: COMMERCIAL

## 2021-04-28 VITALS
WEIGHT: 213 LBS | SYSTOLIC BLOOD PRESSURE: 138 MMHG | BODY MASS INDEX: 34.23 KG/M2 | OXYGEN SATURATION: 98 % | TEMPERATURE: 98.2 F | DIASTOLIC BLOOD PRESSURE: 92 MMHG | HEART RATE: 96 BPM | HEIGHT: 66 IN

## 2021-04-28 DIAGNOSIS — Z11.4 SCREENING FOR HIV (HUMAN IMMUNODEFICIENCY VIRUS): ICD-10-CM

## 2021-04-28 DIAGNOSIS — M25.571 CHRONIC PAIN OF RIGHT ANKLE: ICD-10-CM

## 2021-04-28 DIAGNOSIS — G89.29 CHRONIC PAIN OF RIGHT ANKLE: ICD-10-CM

## 2021-04-28 DIAGNOSIS — Z00.00 ANNUAL PHYSICAL EXAM: Primary | ICD-10-CM

## 2021-04-28 DIAGNOSIS — Z86.59 HISTORY OF ANXIETY: ICD-10-CM

## 2021-04-28 DIAGNOSIS — I10 ESSENTIAL (PRIMARY) HYPERTENSION: ICD-10-CM

## 2021-04-28 DIAGNOSIS — E66.9 OBESITY (BMI 30.0-34.9): ICD-10-CM

## 2021-04-28 DIAGNOSIS — R19.4 FREQUENT BOWEL MOVEMENTS: ICD-10-CM

## 2021-04-28 DIAGNOSIS — Z72.0 TOBACCO USE: ICD-10-CM

## 2021-04-28 PROCEDURE — 3725F SCREEN DEPRESSION PERFORMED: CPT | Performed by: PHYSICIAN ASSISTANT

## 2021-04-28 PROCEDURE — 3008F BODY MASS INDEX DOCD: CPT | Performed by: PHYSICIAN ASSISTANT

## 2021-04-28 PROCEDURE — 4004F PT TOBACCO SCREEN RCVD TLK: CPT | Performed by: PHYSICIAN ASSISTANT

## 2021-04-28 PROCEDURE — 99396 PREV VISIT EST AGE 40-64: CPT | Performed by: PHYSICIAN ASSISTANT

## 2021-04-28 RX ORDER — LISINOPRIL 20 MG/1
20 TABLET ORAL DAILY
Qty: 90 TABLET | Refills: 1 | Status: SHIPPED | OUTPATIENT
Start: 2021-04-28 | End: 2021-11-01

## 2021-04-28 NOTE — PATIENT INSTRUCTIONS
Wellness Visit for Adults   AMBULATORY CARE:   A wellness visit  is when you see your healthcare provider to get screened for health problems  Your healthcare provider will also give you advice on how to stay healthy  Write down your questions so you remember to ask them  Ask your healthcare provider how often you should have a wellness visit  What happens at a wellness visit:  Your healthcare provider will ask about your health, and your family history of health problems  This includes high blood pressure, heart disease, and cancer  He or she will ask if you have symptoms that concern you, if you smoke, and about your mood  You may also be asked about your intake of medicines, supplements, food, and alcohol  Any of the following may be done:  · Your weight  will be checked  Your height may also be checked so your body mass index (BMI) can be calculated  Your BMI shows if you are at a healthy weight  · Your blood pressure  and heart rate will be checked  Your temperature may also be checked  · Blood and urine tests  may be done  Blood tests may be done to check your cholesterol levels  Abnormal cholesterol levels increase your risk for heart disease and stroke  You may also need a blood or urine test to check for diabetes if you are at increased risk  Urine tests may be done to look for signs of an infection or kidney disease  · A physical exam  includes checking your heartbeat and lungs with a stethoscope  Your healthcare provider may also check your skin to look for sun damage  · Screening tests  may be recommended  A screening test is done to check for diseases that may not cause symptoms  The screening tests you may need depend on your age, gender, family history, and lifestyle habits  For example, colorectal screening may be recommended if you are 48years old or older  Screening tests you need if you are a woman:   · A Pap smear  is used to screen for cervical cancer   Pap smears are usually done every 3 to 5 years depending on your age  You may need them more often if you have had abnormal Pap smear test results in the past  Ask your healthcare provider how often you should have a Pap smear  · A mammogram  is an x-ray of your breasts to screen for breast cancer  Experts recommend mammograms every 2 years starting at age 48 years  You may need a mammogram at age 52 years or younger if you have an increased risk for breast cancer  Talk to your healthcare provider about when you should start having mammograms and how often you need them  Vaccines you may need:   · Get an influenza vaccine  every year  The influenza vaccine protects you from the flu  Several types of viruses cause the flu  The viruses change over time, so new vaccines are made each year  · Get a tetanus-diphtheria (Td) booster vaccine  every 10 years  This vaccine protects you against tetanus and diphtheria  Tetanus is a severe infection that may cause painful muscle spasms and lockjaw  Diphtheria is a severe bacterial infection that causes a thick covering in the back of your mouth and throat  · Get a human papillomavirus (HPV) vaccine  if you are female and aged 23 to 32 or male 23 to 24 and never received it  This vaccine protects you from HPV infection  HPV is the most common infection spread by sexual contact  HPV may also cause vaginal, penile, and anal cancers  · Get a pneumococcal vaccine  if you are aged 72 years or older  The pneumococcal vaccine is an injection given to protect you from pneumococcal disease  Pneumococcal disease is an infection caused by pneumococcal bacteria  The infection may cause pneumonia, meningitis, or an ear infection  · Get a shingles vaccine  if you are 60 or older, even if you have had shingles before  The shingles vaccine is an injection to protect you from the varicella-zoster virus  This is the same virus that causes chickenpox   Shingles is a painful rash that develops in people who had chickenpox or have been exposed to the virus  How to eat healthy:  My Plate is a model for planning healthy meals  It shows the types and amounts of foods that should go on your plate  Fruits and vegetables make up about half of your plate, and grains and protein make up the other half  A serving of dairy is included on the side of your plate  The amount of calories and serving sizes you need depends on your age, gender, weight, and height  Examples of healthy foods are listed below:  · Eat a variety of vegetables  such as dark green, red, and orange vegetables  You can also include canned vegetables low in sodium (salt) and frozen vegetables without added butter or sauces  · Eat a variety of fresh fruits , canned fruit in 100% juice, frozen fruit, and dried fruit  · Include whole grains  At least half of the grains you eat should be whole grains  Examples include whole-wheat bread, wheat pasta, brown rice, and whole-grain cereals such as oatmeal     · Eat a variety of protein foods such as seafood (fish and shellfish), lean meat, and poultry without skin (turkey and chicken)  Examples of lean meats include pork leg, shoulder, or tenderloin, and beef round, sirloin, tenderloin, and extra lean ground beef  Other protein foods include eggs and egg substitutes, beans, peas, soy products, nuts, and seeds  · Choose low-fat dairy products such as skim or 1% milk or low-fat yogurt, cheese, and cottage cheese  · Limit unhealthy fats  such as butter, hard margarine, and shortening  Exercise:  Exercise at least 30 minutes per day on most days of the week  Some examples of exercise include walking, biking, dancing, and swimming  You can also fit in more physical activity by taking the stairs instead of the elevator or parking farther away from stores  Include muscle strengthening activities 2 days each week  Regular exercise provides many health benefits   It helps you manage your weight, and decreases your risk for type 2 diabetes, heart disease, stroke, and high blood pressure  Exercise can also help improve your mood  Ask your healthcare provider about the best exercise plan for you  General health and safety guidelines:   · Do not smoke  Nicotine and other chemicals in cigarettes and cigars can cause lung damage  Ask your healthcare provider for information if you currently smoke and need help to quit  E-cigarettes or smokeless tobacco still contain nicotine  Talk to your healthcare provider before you use these products  · Limit alcohol  A drink of alcohol is 12 ounces of beer, 5 ounces of wine, or 1½ ounces of liquor  · Lose weight, if needed  Being overweight increases your risk of certain health conditions  These include heart disease, high blood pressure, type 2 diabetes, and certain types of cancer  · Protect your skin  Do not sunbathe or use tanning beds  Use sunscreen with a SPF 15 or higher  Apply sunscreen at least 15 minutes before you go outside  Reapply sunscreen every 2 hours  Wear protective clothing, hats, and sunglasses when you are outside  · Drive safely  Always wear your seatbelt  Make sure everyone in your car wears a seatbelt  A seatbelt can save your life if you are in an accident  Do not use your cell phone when you are driving  This could distract you and cause an accident  Pull over if you need to make a call or send a text message  · Practice safe sex  Use latex condoms if are sexually active and have more than one partner  Your healthcare provider may recommend screening tests for sexually transmitted infections (STIs)  · Wear helmets, lifejackets, and protective gear  Always wear a helmet when you ride a bike or motorcycle, go skiing, or play sports that could cause a head injury  Wear protective equipment when you play sports  Wear a lifejacket when you are on a boat or doing water sports      © Copyright [employer] 2020 Information is for End User's use only and may not be sold, redistributed or otherwise used for commercial purposes  All illustrations and images included in CareNotes® are the copyrighted property of [employer]  or Brianna Hernandez  The above information is an  only  It is not intended as medical advice for individual conditions or treatments  Talk to your doctor, nurse or pharmacist before following any medical regimen to see if it is safe and effective for you  Ankle Exercises   AMBULATORY CARE:   What you need to know about ankle exercises: Ankle exercises help strengthen your ankle and improve its function after injury  These are beginning exercises  Ask your healthcare provider if you need to see a physical therapist for more advanced exercises  · Do these exercises 3 to 5 days a week , or as directed by your healthcare provider  Ask if you should perform the exercises on each ankle  · Do the exercises in the order that your healthcare provider recommends  This will help prevent swelling, chronic pain, and reinjury  Start with range of motion exercises  Then progress to strengthening exercises, and finally to balancing exercises  · Warm up before you do ankle exercises  Walk or ride a stationary bike for 5 to 10 minutes to prepare your ankle for movement  · Stop if you feel pain  It is normal to feel some discomfort at first  Regular exercise will help decrease your discomfort over time  How to perform range of motion exercises safely:  Begin with range of motion exercises to improve flexibility  Ask your healthcare provider when you can progress to strengthening exercises  · Ankle alphabet:  Sit on a chair so that your feet do not touch the floor  Use your big toe to write each letter of the alphabet  Use only your foot and ankle, and keep your movements small  Do 2 sets           · Calf stretches:      ? Sitting calf stretches with a towel:  Sit on the floor with both legs out straight in front of you  Loop a towel around the ball of your injured foot  Grasp the ends of the towel and pull it toward you  Keep your leg and back straight  Do not lean forward as you pull the towel  Hold for 30 seconds  Then relax for 30 seconds  Do 2 sets of 10          ? Standing calf stretches:  Stand facing a wall with the foot that is not injured forward and your knee slightly bent  Keep the leg with the injured foot straight and behind you with your toes pointed in slightly  With both heels flat on the floor, press your hips forward  Do not arch your back  Hold for 30 seconds, and then relax for 30 seconds  Do 2 sets of 10  Repeat with your leg bent  Do 2 sets of 10  How to perform strengthening exercises safely:  After you can perform range of motion exercises without pain, you may begin strengthening exercises  Ask your healthcare provider when you can progress to balancing exercises  · Ankle movement in 4 directions:  Sit on the floor with your legs straight in front of you  Keep your heels on the floor for support  ? Dorsiflexion:  Begin with your toes pointing straight up  Pull your toes toward your body  Slowly return to the starting position  Do 3 sets of 5      ? Plantar flexion:  Begin with your toes pointing straight up  Push your toes away from your body  Slowly return to the starting position  Do 3 sets of 5          ? Inversion:  Begin with your toes pointing straight up  Push your toes inward, toward each other  Slowly return to the starting position  Do 3 sets of 5      ? Eversion:  Begin with your toes pointing straight up  Push your toes outward, away from each other  Slowly return to the starting position  Do 3 sets of 5        · Toe curls with a towel:  Sit on a chair so that both of your feet are flat on the floor  Place a small towel on the floor in front of your injured foot  Grab the center of the towel with your toes and curl the towel toward you  Relax and repeat   Do 1 set of 5          · [place] pick-ups:  Sit on a chair so that both of your feet are flat on the floor  Place 20 marbles on the floor in front of your injured foot  Use your toes to  one marble at a time and place it into a bowl  Repeat until you have picked up all the marbles  Do 1 set  · Heel raises:      ? Single leg heel raises:  Stand with your weight evenly on both feet  Hold on to a chair or a wall for balance  Lift the foot that is not injured off the floor so all your weight is placed on your injured foot  Raise the heel of your injured foot as high as you can  Slowly lower your heel to the floor  Do 1 set of 10          ? Double leg heel raises:  Stand with your weight evenly on both feet  Hold on to a chair or a wall for balance  Raise both of your heels as high as you can  Slowly lower your heels to the floor  Do 1 set of 10  · Heel and toe walks:      ? Heel walks:  Begin in a standing position  Lift your toes off the floor and walk on your heels  Keep your toes lifted as high as possible  Do 2 sets of 10          ? Toe walks:  Begin in a standing position  Lift your heels off the floor and walk on the balls and toes of your feet  Keep your heels lifted as high as possible  Do 2 sets of 10  How to perform a balance exercise safely:  After you can perform strengthening exercises without pain, you may do this beginning balancing exercise  Ask your healthcare provider for more advanced balance exercises  · Single leg stance:  Stand with your weight evenly on both feet, or hold on to a chair or a wall  Do not lean to the side  Lift the foot that is not injured off the floor so all your weight is placed on your injured foot  Balance on your injured foot  Ask your healthcare provider how long to hold this position  Contact your healthcare provider if:   · Your pain becomes worse  · You have new pain      · You have questions or concerns about your condition, care, or exercise program     © Copyright [employer] Information is for End User's use only and may not be sold, redistributed or otherwise used for commercial purposes  All illustrations and images included in CareNotes® are the copyrighted property of [employer]  or Brianna Hernandez  The above information is an  only  It is not intended as medical advice for individual conditions or treatments  Talk to your doctor, nurse or pharmacist before following any medical regimen to see if it is safe and effective for you             some Debrox over the counter to use for the remaining wax in the right ear canal

## 2021-04-29 PROBLEM — F32.2 CURRENT SEVERE EPISODE OF MAJOR DEPRESSIVE DISORDER WITHOUT PSYCHOTIC FEATURES WITHOUT PRIOR EPISODE (HCC): Status: RESOLVED | Noted: 2018-11-06 | Resolved: 2021-04-29

## 2021-04-29 PROBLEM — G89.29 CHRONIC PAIN OF RIGHT ANKLE: Status: ACTIVE | Noted: 2021-04-29

## 2021-04-29 PROBLEM — E66.9 OBESITY (BMI 30.0-34.9): Status: ACTIVE | Noted: 2021-04-29

## 2021-04-29 PROBLEM — R79.89 ABNORMAL LFTS: Status: RESOLVED | Noted: 2018-05-17 | Resolved: 2021-04-29

## 2021-04-29 PROBLEM — M25.571 CHRONIC PAIN OF RIGHT ANKLE: Status: ACTIVE | Noted: 2021-04-29

## 2021-04-29 PROBLEM — R19.4 FREQUENT BOWEL MOVEMENTS: Status: ACTIVE | Noted: 2021-04-29

## 2021-04-29 PROBLEM — E66.811 OBESITY (BMI 30.0-34.9): Status: ACTIVE | Noted: 2021-04-29

## 2021-04-29 NOTE — ASSESSMENT & PLAN NOTE
Accompanied by bloating and triggered often by food  Check celiac panel and food allergy panel   If persists and results non-contributory, follow-up with GI

## 2021-04-29 NOTE — ASSESSMENT & PLAN NOTE
Mildly uncontrolled today given his inconsistent use of medication  He will restart using lisinopril 20 mg daily and follow-up in 1 month

## 2021-04-29 NOTE — ASSESSMENT & PLAN NOTE
BMI Counseling: Body mass index is 34 91 kg/m²  The BMI is above normal  Nutrition recommendations include decreasing overall calorie intake, 3-5 servings of fruits/vegetables daily and consuming healthier snacks  Exercise recommendations include moderate aerobic physical activity for 150 minutes/week and exercising 3-5 times per week

## 2021-04-29 NOTE — ASSESSMENT & PLAN NOTE
Likely tendonitis  Check right ankle xray to rule out bony abnormality  Try home ankle exercises  F/u with Podiatry if not improving

## 2021-04-29 NOTE — ASSESSMENT & PLAN NOTE
Patient was encouraged to quit smoking  He states that he will get patches over-the-counter  He will call with any problems or concerns  Tobacco Cessation Counseling: Tobacco cessation counseling and education was provided  The patient is sincerely urged to quit consumption of tobacco  He is ready to quit tobacco  The numerous health risks of tobacco consumption were discussed  HE will get patches and check resources available through work  Marii Gila

## 2021-06-02 ENCOUNTER — APPOINTMENT (OUTPATIENT)
Dept: LAB | Facility: CLINIC | Age: 41
End: 2021-06-02
Payer: COMMERCIAL

## 2021-06-02 DIAGNOSIS — Z11.4 SCREENING FOR HIV (HUMAN IMMUNODEFICIENCY VIRUS): ICD-10-CM

## 2021-06-02 DIAGNOSIS — I10 ESSENTIAL (PRIMARY) HYPERTENSION: ICD-10-CM

## 2021-06-02 DIAGNOSIS — E66.9 OBESITY (BMI 30.0-34.9): ICD-10-CM

## 2021-06-02 DIAGNOSIS — R19.4 FREQUENT BOWEL MOVEMENTS: ICD-10-CM

## 2021-06-02 LAB
ALBUMIN SERPL BCP-MCNC: 4.2 G/DL (ref 3.5–5)
ALP SERPL-CCNC: 86 U/L (ref 46–116)
ALT SERPL W P-5'-P-CCNC: 57 U/L (ref 12–78)
ANION GAP SERPL CALCULATED.3IONS-SCNC: 7 MMOL/L (ref 4–13)
AST SERPL W P-5'-P-CCNC: 20 U/L (ref 5–45)
BASOPHILS # BLD AUTO: 0.04 THOUSANDS/ΜL (ref 0–0.1)
BASOPHILS NFR BLD AUTO: 1 % (ref 0–1)
BILIRUB SERPL-MCNC: 1 MG/DL (ref 0.2–1)
BUN SERPL-MCNC: 12 MG/DL (ref 5–25)
CALCIUM SERPL-MCNC: 9.2 MG/DL (ref 8.3–10.1)
CHLORIDE SERPL-SCNC: 102 MMOL/L (ref 100–108)
CHOLEST SERPL-MCNC: 196 MG/DL (ref 50–200)
CO2 SERPL-SCNC: 28 MMOL/L (ref 21–32)
CREAT SERPL-MCNC: 0.8 MG/DL (ref 0.6–1.3)
EOSINOPHIL # BLD AUTO: 0.08 THOUSAND/ΜL (ref 0–0.61)
EOSINOPHIL NFR BLD AUTO: 1 % (ref 0–6)
ERYTHROCYTE [DISTWIDTH] IN BLOOD BY AUTOMATED COUNT: 12.5 % (ref 11.6–15.1)
GFR SERPL CREATININE-BSD FRML MDRD: 128 ML/MIN/1.73SQ M
GLUCOSE P FAST SERPL-MCNC: 76 MG/DL (ref 65–99)
HCT VFR BLD AUTO: 48.3 % (ref 36.5–49.3)
HDLC SERPL-MCNC: 49 MG/DL
HGB BLD-MCNC: 15.6 G/DL (ref 12–17)
IMM GRANULOCYTES # BLD AUTO: 0.02 THOUSAND/UL (ref 0–0.2)
IMM GRANULOCYTES NFR BLD AUTO: 0 % (ref 0–2)
LDLC SERPL CALC-MCNC: 119 MG/DL (ref 0–100)
LYMPHOCYTES # BLD AUTO: 2.48 THOUSANDS/ΜL (ref 0.6–4.47)
LYMPHOCYTES NFR BLD AUTO: 30 % (ref 14–44)
MCH RBC QN AUTO: 30.5 PG (ref 26.8–34.3)
MCHC RBC AUTO-ENTMCNC: 32.3 G/DL (ref 31.4–37.4)
MCV RBC AUTO: 95 FL (ref 82–98)
MONOCYTES # BLD AUTO: 0.55 THOUSAND/ΜL (ref 0.17–1.22)
MONOCYTES NFR BLD AUTO: 7 % (ref 4–12)
NEUTROPHILS # BLD AUTO: 5.23 THOUSANDS/ΜL (ref 1.85–7.62)
NEUTS SEG NFR BLD AUTO: 61 % (ref 43–75)
NRBC BLD AUTO-RTO: 0 /100 WBCS
PLATELET # BLD AUTO: 234 THOUSANDS/UL (ref 149–390)
PMV BLD AUTO: 10.6 FL (ref 8.9–12.7)
POTASSIUM SERPL-SCNC: 3.1 MMOL/L (ref 3.5–5.3)
PROT SERPL-MCNC: 7.1 G/DL (ref 6.4–8.2)
RBC # BLD AUTO: 5.11 MILLION/UL (ref 3.88–5.62)
SODIUM SERPL-SCNC: 137 MMOL/L (ref 136–145)
TRIGL SERPL-MCNC: 142 MG/DL
TSH SERPL DL<=0.05 MIU/L-ACNC: 1.54 UIU/ML (ref 0.36–3.74)
WBC # BLD AUTO: 8.4 THOUSAND/UL (ref 4.31–10.16)

## 2021-06-02 PROCEDURE — 85025 COMPLETE CBC W/AUTO DIFF WBC: CPT

## 2021-06-02 PROCEDURE — 84443 ASSAY THYROID STIM HORMONE: CPT

## 2021-06-02 PROCEDURE — 86255 FLUORESCENT ANTIBODY SCREEN: CPT

## 2021-06-02 PROCEDURE — 86003 ALLG SPEC IGE CRUDE XTRC EA: CPT

## 2021-06-02 PROCEDURE — 83516 IMMUNOASSAY NONANTIBODY: CPT

## 2021-06-02 PROCEDURE — 82784 ASSAY IGA/IGD/IGG/IGM EACH: CPT

## 2021-06-02 PROCEDURE — 36415 COLL VENOUS BLD VENIPUNCTURE: CPT

## 2021-06-02 PROCEDURE — 80061 LIPID PANEL: CPT

## 2021-06-02 PROCEDURE — 82785 ASSAY OF IGE: CPT

## 2021-06-02 PROCEDURE — 87389 HIV-1 AG W/HIV-1&-2 AB AG IA: CPT

## 2021-06-02 PROCEDURE — 80053 COMPREHEN METABOLIC PANEL: CPT

## 2021-06-03 LAB
ALLERGEN COMMENT: NORMAL
ALMOND IGE QN: <0.1 KUA/I
CASHEW NUT IGE QN: <0.1 KUA/I
CODFISH IGE QN: <0.1 KUA/I
EGG WHITE IGE QN: <0.1 KUA/I
ENDOMYSIUM IGA SER QL: NEGATIVE
GLIADIN PEPTIDE IGA SER-ACNC: 4 UNITS (ref 0–19)
GLIADIN PEPTIDE IGG SER-ACNC: 3 UNITS (ref 0–19)
GLUTEN IGE QN: <0.1 KUA/I
HAZELNUT IGE QN: <0.1 KUA/L
HIV 1+2 AB+HIV1 P24 AG SERPL QL IA: NORMAL
IGA SERPL-MCNC: 143 MG/DL (ref 90–386)
MILK IGE QN: <0.1 KUA/I
PEANUT IGE QN: <0.1 KUA/I
SALMON IGE QN: <0.1 KUA/I
SCALLOP IGE QN: <0.1 KUA/L
SESAME SEED IGE QN: <0.1 KUA/I
SHRIMP IGE QN: <0.1 KUA/L
SOYBEAN IGE QN: <0.1 KUA/I
TOTAL IGE SMQN RAST: 48.6 KU/L (ref 0–113)
TTG IGA SER-ACNC: <2 U/ML (ref 0–3)
TTG IGG SER-ACNC: <2 U/ML (ref 0–5)
TUNA IGE QN: <0.1 KUA/I
WALNUT IGE QN: <0.1 KUA/I
WHEAT IGE QN: <0.1 KUA/I

## 2021-06-03 NOTE — PROGRESS NOTES
FAMILY PRACTICE OFFICE VISIT  [facility] - [id number] [facility] PRIMARY CARE       NAME: Zhang Chanel  AGE: 39 y o  SEX: male       : 1980        MRN: [id number]    DATE: 2021  TIME: 4:44 PM    Assessment and Plan     Problem List Items Addressed This Visit        Cardiovascular and Mediastinum    Essential (primary) hypertension - Primary       Improved/well controlled  Continue with lisinopril 20 mg daily  Will continue to monitor  Follow-up in 6 months  Other    Hypokalemia      We reviewed the recent labs showed a low potassium level  His recently restarted blood pressure medications should be causing high potassium level of anything though  He was encouraged to increase his intake of potassium rich foods and will recheck BMP in 1 month  If still low, would recommend supplementation  Relevant Orders    Basic metabolic panel    Obesity (BMI 30 0-34  9)      Encouraged patient to start becoming regularly active as well as working on improving his diet with increasing fruits and vegetables and eating less junk food  Will re-evaluate at next visit  BMI Counseling: Body mass index is 35 23 kg/m²  The BMI is above normal  Nutrition recommendations include decreasing overall calorie intake, 3-5 servings of fruits/vegetables daily and consuming healthier snacks  Exercise recommendations include moderate aerobic physical activity for 150 minutes/week and exercising 3-5 times per week  Essential (primary) hypertension    Improved/well controlled  Continue with lisinopril 20 mg daily  Will continue to monitor  Follow-up in 6 months  Obesity (BMI 30 0-34  9)   Encouraged patient to start becoming regularly active as well as working on improving his diet with increasing fruits and vegetables and eating less junk food  Will re-evaluate at next visit  BMI Counseling: Body mass index is 35 23 kg/m²   The BMI is above normal  Nutrition recommendations include decreasing overall calorie intake, 3-5 servings of fruits/vegetables daily and consuming healthier snacks  Exercise recommendations include moderate aerobic physical activity for 150 minutes/week and exercising 3-5 times per week  Hypokalemia   We reviewed the recent labs showed a low potassium level  His recently restarted blood pressure medications should be causing high potassium level of anything though  He was encouraged to increase his intake of potassium rich foods and will recheck BMP in 1 month  If still low, would recommend supplementation  Chief Complaint     Chief Complaint   Patient presents with    Follow-up       History of Present Illness   Jack Ornelas is a 39y o -year-old male who presents for recheck BP  The patient reports that current blood pressure medications include lisinopril 20 mg daily (restarted last visit)  Blood pressure readings at home are not checked  He denies negative side effects and notes that he has been feeling better - no more upper chest tightness  The patient denies symptoms of poor control such as chest pain, shortness of breath, leg swelling, vision changes, headaches, or dizziness  The patient reports regular caffeine intake and unlimited salt intake  Review of Systems   Review of Systems   Constitutional: Negative for chills and fever  Respiratory: Negative for shortness of breath  Cardiovascular: Negative for chest pain, palpitations and leg swelling  Neurological: Negative for dizziness and headaches  Active Problem List     Patient Active Problem List   Diagnosis    Essential (primary) hypertension    History of anxiety    Hypokalemia    Tobacco use    Chronic pain of right ankle    Frequent bowel movements    Obesity (BMI 30 0-34  9)         Past Medical History:  Past Medical History:   Diagnosis Date    Abnormal LFTs     [facility] 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 [facility]    Rectal hemorrhage        Past Surgical History:  Past Surgical History:   Procedure Laterality Date    VASECTOMY      Vas Deferens       Family History:  Family History   Problem Relation Age of Onset    Hiatal hernia Mother     Hypertension Mother     Skin cancer Mother         melanoma    Colon polyps Mother         Of the sigmoid colon    Diabetes Father     Hypertension Brother     Colon cancer Maternal Grandmother     Diabetes Maternal Uncle        Social History:  Social History     Socioeconomic History    Marital status: /Civil Union     Spouse name: Not on file    Number of children: Not on file    Years of education: Not on file    Highest education level: Not on file   Occupational History    Not on file   Social Needs    Financial resource strain: Not on file    Food insecurity     Worry: Not on file     Inability: Not on file    Transportation needs     Medical: Not on file     Non-medical: Not on file   Tobacco Use    Smoking status: Current Every Day Smoker     Packs/day: 0 50     Years: 25 00     Pack years: 12 50    Smokeless tobacco: Never Used    Tobacco comment: tobacco use - has cut from 2 ppd almost when was depressed   Substance and Sexual Activity    Alcohol use: Yes     Frequency: 2-4 times a month     Drinks per session: 5 or 6     Binge frequency: Weekly     Comment: Being a social drinker    Drug use: No    Sexual activity: Not on file   Lifestyle    Physical activity     Days per week: Not on file     Minutes per session: Not on file    Stress: Not on file   Relationships    Social connections     Talks on phone: Not on file     Gets together: Not on file     Attends Sabianist service: Not on file     Active member of club or organization: Not on file     Attends meetings of clubs or organizations: Not on file     Relationship status: Not on file    Intimate partner violence     Fear of current or ex partner: Not on file     Emotionally abused: Not on file     Physically abused: Not on file     Forced sexual activity: Not on file   Other Topics Concern    Not on file   Social History Narrative    Not on file       Objective     Vitals:    06/04/21 1446   BP: 126/80   BP Location: Left arm   Patient Position: Sitting   Cuff Size: Adult   Pulse: 104   Temp: 97 7 °F (36 5 °C)   TempSrc: Temporal   SpO2: 93%   Weight: 97 5 kg (215 lb)   Height: 5' 5 5" (1 664 m)     Wt Readings from Last 3 Encounters:   06/04/21 97 5 kg (215 lb)   04/28/21 96 6 kg (213 lb)   02/27/20 92 7 kg (204 lb 6 oz)       Physical Exam  Vitals signs reviewed  Constitutional:       General: He is not in acute distress  Appearance: Normal appearance  He is well-developed  He is not ill-appearing  HENT:      Head: Normocephalic and atraumatic  Neck:      Musculoskeletal: Neck supple  Thyroid: No thyromegaly  Cardiovascular:      Rate and Rhythm: Normal rate and regular rhythm  Heart sounds: Normal heart sounds  No murmur  Pulmonary:      Effort: Pulmonary effort is normal       Breath sounds: Normal breath sounds  No wheezing, rhonchi or rales  Musculoskeletal:      Right lower leg: No edema  Left lower leg: No edema  Lymphadenopathy:      Cervical: No cervical adenopathy  Neurological:      Mental Status: He is alert     Psychiatric:         Mood and Affect: Mood normal          Behavior: Behavior normal          Pertinent Laboratory/Diagnostic Studies:  Lab Results   Component Value Date    GLUCOSE 101 11/11/2014    BUN 12 06/02/2021    CREATININE 0 80 06/02/2021    CALCIUM 9 2 06/02/2021     11/11/2014    K 3 1 (L) 06/02/2021    CO2 28 06/02/2021     06/02/2021     Lab Results   Component Value Date    ALT 57 06/02/2021    AST 20 06/02/2021    ALKPHOS 86 06/02/2021    BILITOT 1 73 (H) 11/11/2014       Lab Results   Component Value Date    WBC 8 40 06/02/2021    HGB 15 6 06/02/2021 HCT 48 3 06/02/2021    MCV 95 06/02/2021     06/02/2021       Lab Results   Component Value Date    CHOL 165 11/11/2014     Lab Results   Component Value Date    TRIG 142 06/02/2021     Lab Results   Component Value Date    HDL 49 06/02/2021     Lab Results   Component Value Date    LDLCALC 119 (H) 06/02/2021     Results for orders placed or performed in visit on 06/02/21   CBC and differential   Result Value Ref Range    WBC 8 40 4 31 - 10 16 Thousand/uL    RBC 5 11 3 88 - 5 62 Million/uL    Hemoglobin 15 6 12 0 - 17 0 g/dL    Hematocrit 48 3 36 5 - 49 3 %    MCV 95 82 - 98 fL    MCH 30 5 26 8 - 34 3 pg    MCHC 32 3 31 4 - 37 4 g/dL    RDW 12 5 11 6 - 15 1 %    MPV 10 6 8 9 - 12 7 fL    Platelets 588 121 - 110 Thousands/uL    nRBC 0 /100 WBCs    Neutrophils Relative 61 43 - 75 %    Immat GRANS % 0 0 - 2 %    Lymphocytes Relative 30 14 - 44 %    Monocytes Relative 7 4 - 12 %    Eosinophils Relative 1 0 - 6 %    Basophils Relative 1 0 - 1 %    Neutrophils Absolute 5 23 1 85 - 7 62 Thousands/µL    Immature Grans Absolute 0 02 0 00 - 0 20 Thousand/uL    Lymphocytes Absolute 2 48 0 60 - 4 47 Thousands/µL    Monocytes Absolute 0 55 0 17 - 1 22 Thousand/µL    Eosinophils Absolute 0 08 0 00 - 0 61 Thousand/µL    Basophils Absolute 0 04 0 00 - 0 10 Thousands/µL   Comprehensive metabolic panel   Result Value Ref Range    Sodium 137 136 - 145 mmol/L    Potassium 3 1 (L) 3 5 - 5 3 mmol/L    Chloride 102 100 - 108 mmol/L    CO2 28 21 - 32 mmol/L    ANION GAP 7 4 - 13 mmol/L    BUN 12 5 - 25 mg/dL    Creatinine 0 80 0 60 - 1 30 mg/dL    Glucose, Fasting 76 65 - 99 mg/dL    Calcium 9 2 8 3 - 10 1 mg/dL    AST 20 5 - 45 U/L    ALT 57 12 - 78 U/L    Alkaline Phosphatase 86 46 - 116 U/L    Total Protein 7 1 6 4 - 8 2 g/dL    Albumin 4 2 3 5 - 5 0 g/dL    Total Bilirubin 1 00 0 20 - 1 00 mg/dL    eGFR 128 ml/min/1 73sq m   Lipid Panel with Direct LDL reflex   Result Value Ref Range    Cholesterol 196 50 - 200 mg/dL Triglycerides 142 <=150 mg/dL    HDL, Direct 49 >=40 mg/dL    LDL Calculated 119 (H) 0 - 100 mg/dL   TSH, 3rd generation with Free T4 reflex   Result Value Ref Range    TSH 3RD GENERATON 1 540 0 358 - 3 740 uIU/mL   Celiac Disease Antibody Profile   Result Value Ref Range    IgA 143 90 - 386 mg/dL    Gliadin IgA 4 0 - 19 units    Gliadin IgG 3 0 - 19 units    Tissue Transglut Ab IGG <2 0 - 5 U/mL    TISSUE TRANSGLUTAMINASE IGA <2 0 - 3 U/mL    Endomysial IgA Negative Negative   Food Allergy Profile   Result Value Ref Range    Fish Cod <0 10 0 00 - 0 09 kUA/I    Egg White <0 10 0 00 - 0 09 kUA/I    Gluten <0 10 0 00 - 0 09 kUA/I    Milk, Cow's <0 10 0 00 - 0 09 kUA/I    Peanut <0 10 0 00 - 0 09 kUA/I    [place] <0 10 0 00 - 0 09 kUA/I    Scallop IgE <0 10 0 00 - 0 09 kUA/l    Sesame Seed IgE <0 10 0 00 - 0 09 kUA/I    Shrimp <0 10 0 00 - 0 09 kUA/l    SOYBEAN <0 10 0 00 - 0 09 kUA/I    Tuna IgE <0 10 0 00 - 0 09 kUA/I    [place] <0 10 0 00 - 0 09 kUA/I    Wheat <0 10 0 00 - 0 09 kUA/I    Almonds <0 10 0 00 - 0 09 kUA/I    Cashew <0 10 0 00 - 0 09 kUA/I    Hazelnut <0 10 0 00 - 0 09 kUA/l    IgE 48 6 0 - 113 kU/l    Allergen Comment See Below    HIV 1/2 ANTIGEN/ANTIBODY (4TH GENERATION) W REFLEX [facility]   Result Value Ref Range    HIV-1/HIV-2 Ab Non-Reactive Non-Reactive       Orders Placed This Encounter   Procedures    Basic metabolic panel       ALLERGIES:  No Known Allergies    Current Medications     Current Outpatient Medications   Medication Sig Dispense Refill    lisinopril (ZESTRIL) 20 mg tablet Take 1 tablet (20 mg total) by mouth daily 90 tablet 1     No current facility-administered medications for this visit            Health Maintenance     Health Maintenance   Topic Date Due    Pneumococcal Vaccine: Pediatrics (0 to 5 Years) and At-Risk Patients (6 to 59 Years) (1 of 1 - PPSV23) Never done    COVID-19 Vaccine (1) Never done    Annual Physical  04/28/2022    BMI: Followup Plan  04/29/2022    Depression Screening PHQ  06/04/2022    BMI: Adult  06/04/2022    DTaP,Tdap,and Td Vaccines (2 - Td) 08/03/2022    HIV Screening  Completed    Influenza Vaccine  Completed    HIB Vaccine  Aged Out    Hepatitis B Vaccine  Aged Out    IPV Vaccine  Aged Out    Hepatitis A Vaccine  Aged Out    Meningococcal ACWY Vaccine  Aged Out    HPV Vaccine  Aged Out     Immunization History   Administered Date(s) Administered    INFLUENZA 10/09/2019, 10/30/2020    Influenza Quadrivalent Preservative Free 3 years and older IM 11/11/2014, 10/29/2018    Tdap 08/03/2012       Katy Hogan PA-C  6/5/2021 4:44 PM  [place] [id number] [facility]

## 2021-06-04 ENCOUNTER — OFFICE VISIT (OUTPATIENT)
Dept: FAMILY MEDICINE CLINIC | Facility: CLINIC | Age: 41
End: 2021-06-04
Payer: COMMERCIAL

## 2021-06-04 VITALS
OXYGEN SATURATION: 93 % | TEMPERATURE: 97.7 F | DIASTOLIC BLOOD PRESSURE: 80 MMHG | HEART RATE: 104 BPM | SYSTOLIC BLOOD PRESSURE: 126 MMHG | BODY MASS INDEX: 34.55 KG/M2 | HEIGHT: 66 IN | WEIGHT: 215 LBS

## 2021-06-04 DIAGNOSIS — I10 ESSENTIAL (PRIMARY) HYPERTENSION: Primary | ICD-10-CM

## 2021-06-04 DIAGNOSIS — E66.9 OBESITY (BMI 30.0-34.9): ICD-10-CM

## 2021-06-04 DIAGNOSIS — E87.6 HYPOKALEMIA: ICD-10-CM

## 2021-06-04 PROCEDURE — 3074F SYST BP LT 130 MM HG: CPT | Performed by: PHYSICIAN ASSISTANT

## 2021-06-04 PROCEDURE — 3725F SCREEN DEPRESSION PERFORMED: CPT | Performed by: PHYSICIAN ASSISTANT

## 2021-06-04 PROCEDURE — 3079F DIAST BP 80-89 MM HG: CPT | Performed by: PHYSICIAN ASSISTANT

## 2021-06-04 PROCEDURE — 3008F BODY MASS INDEX DOCD: CPT | Performed by: PHYSICIAN ASSISTANT

## 2021-06-04 PROCEDURE — 4004F PT TOBACCO SCREEN RCVD TLK: CPT | Performed by: PHYSICIAN ASSISTANT

## 2021-06-04 PROCEDURE — 99213 OFFICE O/P EST LOW 20 MIN: CPT | Performed by: PHYSICIAN ASSISTANT

## 2021-06-05 NOTE — ASSESSMENT & PLAN NOTE
Improved/well controlled  Continue with lisinopril 20 mg daily  Will continue to monitor  Follow-up in 6 months

## 2021-06-05 NOTE — ASSESSMENT & PLAN NOTE
Encouraged patient to start becoming regularly active as well as working on improving his diet with increasing fruits and vegetables and eating less junk food  Will re-evaluate at next visit  BMI Counseling: Body mass index is 35 23 kg/m²  The BMI is above normal  Nutrition recommendations include decreasing overall calorie intake, 3-5 servings of fruits/vegetables daily and consuming healthier snacks  Exercise recommendations include moderate aerobic physical activity for 150 minutes/week and exercising 3-5 times per week

## 2021-06-05 NOTE — ASSESSMENT & PLAN NOTE
We reviewed the recent labs showed a low potassium level  His recently restarted blood pressure medications should be causing high potassium level of anything though  He was encouraged to increase his intake of potassium rich foods and will recheck BMP in 1 month  If still low, would recommend supplementation

## 2022-01-13 NOTE — PROGRESS NOTES
FAMILY PRACTICE OFFICE VISIT  [facility] - [address] [facility] PRIMARY CARE       NAME: Shreyas Arango  AGE: 39 y o  SEX: male       : 1980        MRN: [id number]    DATE: 1/15/2022  TIME: 1:59 AM    Assessment and Plan     Problem List Items Addressed This Visit        Cardiovascular and Mediastinum    Essential (primary) hypertension - Primary     Controlled  Continue lisinopril 20 mg daily  Relevant Orders    CBC and differential    Comprehensive metabolic panel    Lipid Panel with Direct LDL reflex    TSH, 3rd generation with Free T4 reflex       Other    Hypokalemia     Recheck BMP as ordered  Relevant Orders    Comprehensive metabolic panel    Tobacco use     Tobacco Cessation Counseling: Tobacco cessation counseling and education was provided  The patient is sincerely urged to quit consumption of tobacco  He is not sure if he is ready to quit tobacco  The numerous health risks of tobacco consumption were discussed  If he decides to quit, there are a number of helpful adjunctive aids, and he can see me to discuss nicotine replacement therapy, chantix, or bupropion anytime in the future  Obesity (BMI 30-39  9)     BMI Counseling: Body mass index is 36 05 kg/m²  The BMI is above normal  Nutrition recommendations include 3-5 servings of fruits/vegetables daily  Exercise recommendations include moderate aerobic physical activity for 150 minutes/week and exercising 3-5 times per week  Relevant Orders    CBC and differential    Comprehensive metabolic panel    Lipid Panel with Direct LDL reflex    TSH, 3rd generation with Free T4 reflex      Other Visit Diagnoses     Need for hepatitis C screening test        Relevant Orders    Hepatitis C antibody          Essential (primary) hypertension  Controlled  Continue lisinopril 20 mg daily  Hypokalemia  Recheck BMP as ordered  Obesity (BMI 30-39  9)  BMI Counseling:  Body mass index is 36 05 kg/m²  The BMI is above normal  Nutrition recommendations include 3-5 servings of fruits/vegetables daily  Exercise recommendations include moderate aerobic physical activity for 150 minutes/week and exercising 3-5 times per week  Tobacco use  Tobacco Cessation Counseling: Tobacco cessation counseling and education was provided  The patient is sincerely urged to quit consumption of tobacco  He is not sure if he is ready to quit tobacco  The numerous health risks of tobacco consumption were discussed  If he decides to quit, there are a number of helpful adjunctive aids, and he can see me to discuss nicotine replacement therapy, chantix, or bupropion anytime in the future  Chief Complaint     Chief Complaint   Patient presents with    Follow-up       History of Present Illness   Komal Carrasquillo is a 39y o -year-old male who presents for 6 month follow-up on chronic conditions  The patient reports that current blood pressure medications include lisinopril 20 mg daily  Blood pressure readings at home are not checked  The patient denies symptoms of poor control such as chest pain, shortness of breath, leg swelling, vision changes, headaches (besides rare tension headaches), or dizziness  The patient reports occasional soda for caffeine intake and unlimited salt intake  Since the patent's last visit, weight has increased 5 pounds  Diet is reported to be poor  Exercise does not occur regularly  He has a hx of hypokalemia  Last labs showed K of 3 1 in 6/2021 - was directed to increase potassium intake and recheck - did not have repeat done  He is smoking about 1/2 ppd  He is possibly ready to quit  Review of Systems   Review of Systems   Constitutional: Negative for chills and fever  Respiratory: Negative for shortness of breath  Cardiovascular: Negative for chest pain, palpitations and leg swelling  Neurological: Negative for dizziness and headaches         Active Problem List     Patient Active Problem List   Diagnosis    Essential (primary) hypertension    History of anxiety    Hypokalemia    Tobacco use    Chronic pain of right ankle    Frequent bowel movements    Obesity (BMI 30-39  9)         Past Medical History:  Past Medical History:   Diagnosis Date    Abnormal LFTs     Current severe episode of 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 without prior episode ([facility] ) 11/6/2018    Rectal hemorrhage        Past Surgical History:  Past Surgical History:   Procedure Laterality Date    VASECTOMY      Vas Deferens       Family History:  Family History   Problem Relation Age of Onset    Hiatal hernia Mother     Hypertension Mother     Skin cancer Mother         melanoma    Colon polyps Mother         Of the sigmoid colon    Diabetes Father     Hypertension Brother     Colon cancer Maternal Grandmother     Diabetes Maternal Uncle        Social History:  Social History     Socioeconomic History    Marital status: /Civil Union     Spouse name: Not on file    Number of children: Not on file    Years of education: Not on file    Highest education level: Not on file   Occupational History    Not on file   Tobacco Use    Smoking status: Current Every Day Smoker     Packs/day: 0 50     Years: 25 00     Pack years: 12 50    Smokeless tobacco: Never Used    Tobacco comment: tobacco use - has cut from 2 ppd almost when was depressed   Vaping Use    Vaping Use: Never used   Substance and Sexual Activity    Alcohol use: Yes     Comment: Being a social drinker    Drug use: No    Sexual activity: Not on file   Other Topics Concern    Not on file   Social History Narrative    Not on file     Social Determinants of Health     Financial Resource Strain: Not on file   Food Insecurity: Not on file   Transportation Needs: Not on file   Physical Activity: Not on file   Stress: Not on file   Social Connections: Not on file   Intimate Partner Violence: Not on file   Housing Stability: Not on file       Objective     Vitals:    01/14/22 0921   BP: 130/70   BP Location: Left arm   Patient Position: Sitting   Cuff Size: Large   Pulse: 89   Temp: 97 5 °F (36 4 °C)   TempSrc: Temporal   SpO2: 97%   Weight: 99 8 kg (220 lb)   Height: 5' 5 5" (1 664 m)     Wt Readings from Last 3 Encounters:   01/14/22 99 8 kg (220 lb)   06/04/21 97 5 kg (215 lb)   04/28/21 96 6 kg (213 lb)       Physical Exam  Vitals reviewed  Constitutional:       General: He is not in acute distress  Appearance: Normal appearance  He is well-developed  He is obese  He is not ill-appearing  HENT:      Head: Normocephalic and atraumatic  Neck:      Thyroid: No thyromegaly  Cardiovascular:      Rate and Rhythm: Normal rate and regular rhythm  Pulses: Normal pulses  Heart sounds: Normal heart sounds  No murmur heard  Comments: No carotid bruits noted  Pulmonary:      Effort: Pulmonary effort is normal       Breath sounds: Normal breath sounds  No wheezing, rhonchi or rales  Musculoskeletal:      Cervical back: Neck supple  Right lower leg: No edema  Left lower leg: No edema  Lymphadenopathy:      Cervical: No cervical adenopathy  Skin:     General: Skin is warm and dry  Neurological:      Mental Status: He is alert  Psychiatric:         Mood and Affect: Mood normal          Behavior: Behavior normal          Thought Content:  Thought content normal          Judgment: Judgment normal          Pertinent Laboratory/Diagnostic Studies:  Lab Results   Component Value Date    GLUCOSE 101 11/11/2014    BUN 12 06/02/2021    CREATININE 0 80 06/02/2021    CALCIUM 9 2 06/02/2021     11/11/2014    K 3 1 (L) 06/02/2021    CO2 28 06/02/2021     06/02/2021     Lab Results   Component Value Date    ALT 57 06/02/2021    AST 20 06/02/2021    ALKPHOS 86 06/02/2021    BILITOT 1 73 (H) 11/11/2014       Lab Results   Component Value Date    WBC 8 40 06/02/2021    HGB 15 6 06/02/2021    HCT 48 3 06/02/2021    MCV 95 06/02/2021     06/02/2021     Lab Results   Component Value Date    CHOL 165 11/11/2014     Lab Results   Component Value Date    TRIG 142 06/02/2021     Lab Results   Component Value Date    HDL 49 06/02/2021     Lab Results   Component Value Date    LDLCALC 119 (H) 06/02/2021     Results for orders placed or performed in visit on 06/02/21   CBC and differential   Result Value Ref Range    WBC 8 40 4 31 - 10 16 Thousand/uL    RBC 5 11 3 88 - 5 62 Million/uL    Hemoglobin 15 6 12 0 - 17 0 g/dL    Hematocrit 48 3 36 5 - 49 3 %    MCV 95 82 - 98 fL    MCH 30 5 26 8 - 34 3 pg    MCHC 32 3 31 4 - 37 4 g/dL    RDW 12 5 11 6 - 15 1 %    MPV 10 6 8 9 - 12 7 fL    Platelets 458 629 - 548 Thousands/uL    nRBC 0 /100 WBCs    Neutrophils Relative 61 43 - 75 %    Immat GRANS % 0 0 - 2 %    Lymphocytes Relative 30 14 - 44 %    Monocytes Relative 7 4 - 12 %    Eosinophils Relative 1 0 - 6 %    Basophils Relative 1 0 - 1 %    Neutrophils Absolute 5 23 1 85 - 7 62 Thousands/µL    Immature Grans Absolute 0 02 0 00 - 0 20 Thousand/uL    Lymphocytes Absolute 2 48 0 60 - 4 47 Thousands/µL    Monocytes Absolute 0 55 0 17 - 1 22 Thousand/µL    Eosinophils Absolute 0 08 0 00 - 0 61 Thousand/µL    Basophils Absolute 0 04 0 00 - 0 10 Thousands/µL   Comprehensive metabolic panel   Result Value Ref Range    Sodium 137 136 - 145 mmol/L    Potassium 3 1 (L) 3 5 - 5 3 mmol/L    Chloride 102 100 - 108 mmol/L    CO2 28 21 - 32 mmol/L    ANION GAP 7 4 - 13 mmol/L    BUN 12 5 - 25 mg/dL    Creatinine 0 80 0 60 - 1 30 mg/dL    Glucose, Fasting 76 65 - 99 mg/dL    Calcium 9 2 8 3 - 10 1 mg/dL    AST 20 5 - 45 U/L    ALT 57 12 - 78 U/L    Alkaline Phosphatase 86 46 - 116 U/L    Total Protein 7 1 6 4 - 8 2 g/dL    Albumin 4 2 3 5 - 5 0 g/dL    Total Bilirubin 1 00 0 20 - 1 00 mg/dL    eGFR 128 ml/min/1 73sq m   Lipid Panel with Direct LDL reflex   Result Value Ref Range Cholesterol 196 50 - 200 mg/dL    Triglycerides 142 <=150 mg/dL    HDL, Direct 49 >=40 mg/dL    LDL Calculated 119 (H) 0 - 100 mg/dL   TSH, 3rd generation with Free T4 reflex   Result Value Ref Range    TSH 3RD GENERATON 1 540 0 358 - 3 740 uIU/mL   Celiac Disease Antibody Profile   Result Value Ref Range    IgA 143 90 - 386 mg/dL    Gliadin IgA 4 0 - 19 units    Gliadin IgG 3 0 - 19 units    Tissue Transglut Ab IGG <2 0 - 5 U/mL    TISSUE TRANSGLUTAMINASE IGA <2 0 - 3 U/mL    Endomysial IgA Negative Negative   Food Allergy Profile   Result Value Ref Range    Fish Cod <0 10 0 00 - 0 09 kUA/I    Egg White <0 10 0 00 - 0 09 kUA/I    Gluten <0 10 0 00 - 0 09 kUA/I    Milk, Cow's <0 10 0 00 - 0 09 kUA/I    Peanut <0 10 0 00 - 0 09 kUA/I    [place] <0 10 0 00 - 0 09 kUA/I    Scallop IgE <0 10 0 00 - 0 09 kUA/l    Sesame Seed IgE <0 10 0 00 - 0 09 kUA/I    Shrimp <0 10 0 00 - 0 09 kUA/l    SOYBEAN <0 10 0 00 - 0 09 kUA/I    Tuna IgE <0 10 0 00 - 0 09 kUA/I    [place] <0 10 0 00 - 0 09 kUA/I    Wheat <0 10 0 00 - 0 09 kUA/I    Almonds <0 10 0 00 - 0 09 kUA/I    Cashew <0 10 0 00 - 0 09 kUA/I    Hazelnut <0 10 0 00 - 0 09 kUA/l    IgE 48 6 0 - 113 kU/l    Allergen Comment See Below    HIV 1/2 ANTIGEN/ANTIBODY (4TH GENERATION) W REFLEX [facility]   Result Value Ref Range    HIV-1/HIV-2 Ab Non-Reactive Non-Reactive       Orders Placed This Encounter   Procedures    CBC and differential    Comprehensive metabolic panel    Lipid Panel with Direct LDL reflex    TSH, 3rd generation with Free T4 reflex    Hepatitis C antibody       ALLERGIES:  No Known Allergies    Current Medications     Current Outpatient Medications   Medication Sig Dispense Refill    lisinopril (ZESTRIL) 20 mg tablet TAKE 1 TABLET BY MOUTH EVERY DAY 30 tablet 0     No current facility-administered medications for this visit           Health Maintenance     Health Maintenance   Topic Date Due    Hepatitis C Screening  Never done    COVID-19 Vaccine (1) Never done    Pneumococcal Vaccine: Pediatrics (0 to 5 Years) and At-Risk Patients (6 to 59 Years) (1 of 2 - PPSV23) Never done    Influenza Vaccine (1) 09/01/2021    Depression Screening  06/04/2022    Annual Physical  04/28/2022    DTaP,Tdap,and Td Vaccines (2 - Td or Tdap) 08/03/2022    BMI: Adult  01/14/2023    BMI: Followup Plan  01/15/2023    HIV Screening  Completed    HIB Vaccine  Aged Out    Hepatitis B Vaccine  Aged Out    IPV Vaccine  Aged Out    Hepatitis A Vaccine  Aged Out    Meningococcal ACWY Vaccine  Aged Out    HPV Vaccine  Aged Out     Immunization History   Administered Date(s) Administered    INFLUENZA 10/09/2019, 10/30/2020    Influenza Quadrivalent Preservative Free 3 years and older IM 11/11/2014, 10/29/2018    Tdap 08/03/2012       Hussain Cárdenas PA-C  1/15/2022 1:59 AM  [facility]

## 2022-01-14 ENCOUNTER — OFFICE VISIT (OUTPATIENT)
Dept: FAMILY MEDICINE CLINIC | Facility: CLINIC | Age: 42
End: 2022-01-14
Payer: COMMERCIAL

## 2022-01-14 VITALS
HEART RATE: 89 BPM | HEIGHT: 66 IN | SYSTOLIC BLOOD PRESSURE: 130 MMHG | TEMPERATURE: 97.5 F | BODY MASS INDEX: 35.36 KG/M2 | WEIGHT: 220 LBS | OXYGEN SATURATION: 97 % | DIASTOLIC BLOOD PRESSURE: 70 MMHG

## 2022-01-14 DIAGNOSIS — E66.9 OBESITY (BMI 30-39.9): ICD-10-CM

## 2022-01-14 DIAGNOSIS — E87.6 HYPOKALEMIA: ICD-10-CM

## 2022-01-14 DIAGNOSIS — Z11.59 NEED FOR HEPATITIS C SCREENING TEST: ICD-10-CM

## 2022-01-14 DIAGNOSIS — Z72.0 TOBACCO USE: ICD-10-CM

## 2022-01-14 DIAGNOSIS — I10 ESSENTIAL (PRIMARY) HYPERTENSION: Primary | ICD-10-CM

## 2022-01-14 DIAGNOSIS — E66.9 OBESITY (BMI 30.0-34.9): ICD-10-CM

## 2022-01-14 PROCEDURE — 4004F PT TOBACCO SCREEN RCVD TLK: CPT | Performed by: PHYSICIAN ASSISTANT

## 2022-01-14 PROCEDURE — 3008F BODY MASS INDEX DOCD: CPT | Performed by: PHYSICIAN ASSISTANT

## 2022-01-14 PROCEDURE — 99214 OFFICE O/P EST MOD 30 MIN: CPT | Performed by: PHYSICIAN ASSISTANT

## 2022-01-14 PROCEDURE — 3075F SYST BP GE 130 - 139MM HG: CPT | Performed by: PHYSICIAN ASSISTANT

## 2022-01-14 PROCEDURE — 3078F DIAST BP <80 MM HG: CPT | Performed by: PHYSICIAN ASSISTANT

## 2022-01-15 NOTE — ASSESSMENT & PLAN NOTE
Tobacco Cessation Counseling: Tobacco cessation counseling and education was provided  The patient is sincerely urged to quit consumption of tobacco  He is not sure if he is ready to quit tobacco  The numerous health risks of tobacco consumption were discussed  If he decides to quit, there are a number of helpful adjunctive aids, and he can see me to discuss nicotine replacement therapy, chantix, or bupropion anytime in the future

## 2022-01-15 NOTE — ASSESSMENT & PLAN NOTE
BMI Counseling: Body mass index is 36 05 kg/m²  The BMI is above normal  Nutrition recommendations include 3-5 servings of fruits/vegetables daily  Exercise recommendations include moderate aerobic physical activity for 150 minutes/week and exercising 3-5 times per week

## 2022-02-13 DIAGNOSIS — I10 ESSENTIAL (PRIMARY) HYPERTENSION: ICD-10-CM

## 2022-02-13 RX ORDER — LISINOPRIL 20 MG/1
TABLET ORAL
Qty: 30 TABLET | Refills: 0 | Status: SHIPPED | OUTPATIENT
Start: 2022-02-13 | End: 2022-03-16

## 2022-03-16 DIAGNOSIS — I10 ESSENTIAL (PRIMARY) HYPERTENSION: ICD-10-CM

## 2022-03-16 RX ORDER — LISINOPRIL 20 MG/1
TABLET ORAL
Qty: 30 TABLET | Refills: 0 | Status: SHIPPED | OUTPATIENT
Start: 2022-03-16 | End: 2022-04-23

## 2022-04-23 DIAGNOSIS — I10 ESSENTIAL (PRIMARY) HYPERTENSION: ICD-10-CM

## 2022-04-23 RX ORDER — LISINOPRIL 20 MG/1
TABLET ORAL
Qty: 30 TABLET | Refills: 0 | Status: SHIPPED | OUTPATIENT
Start: 2022-04-23 | End: 2022-04-23

## 2022-04-23 RX ORDER — LISINOPRIL 20 MG/1
20 TABLET ORAL DAILY
Qty: 30 TABLET | Refills: 0 | Status: SHIPPED | OUTPATIENT
Start: 2022-04-23 | End: 2022-06-02

## 2022-06-02 DIAGNOSIS — I10 ESSENTIAL (PRIMARY) HYPERTENSION: ICD-10-CM

## 2022-06-02 RX ORDER — LISINOPRIL 20 MG/1
TABLET ORAL
Qty: 30 TABLET | Refills: 1 | Status: SHIPPED | OUTPATIENT
Start: 2022-06-02 | End: 2022-08-10

## 2022-07-20 ENCOUNTER — APPOINTMENT (OUTPATIENT)
Dept: LAB | Facility: CLINIC | Age: 42
End: 2022-07-20
Payer: COMMERCIAL

## 2022-07-20 DIAGNOSIS — I10 ESSENTIAL (PRIMARY) HYPERTENSION: ICD-10-CM

## 2022-07-20 DIAGNOSIS — Z11.59 NEED FOR HEPATITIS C SCREENING TEST: ICD-10-CM

## 2022-07-20 DIAGNOSIS — E87.6 HYPOKALEMIA: ICD-10-CM

## 2022-07-20 DIAGNOSIS — E66.9 OBESITY (BMI 30-39.9): ICD-10-CM

## 2022-07-20 LAB
ALBUMIN SERPL BCP-MCNC: 3.8 G/DL (ref 3.5–5)
ALP SERPL-CCNC: 88 U/L (ref 46–116)
ALT SERPL W P-5'-P-CCNC: 54 U/L (ref 12–78)
ANION GAP SERPL CALCULATED.3IONS-SCNC: 8 MMOL/L (ref 4–13)
AST SERPL W P-5'-P-CCNC: 20 U/L (ref 5–45)
BASOPHILS # BLD AUTO: 0.03 THOUSANDS/ΜL (ref 0–0.1)
BASOPHILS NFR BLD AUTO: 0 % (ref 0–1)
BILIRUB SERPL-MCNC: 1.08 MG/DL (ref 0.2–1)
BUN SERPL-MCNC: 17 MG/DL (ref 5–25)
CALCIUM SERPL-MCNC: 9 MG/DL (ref 8.3–10.1)
CHLORIDE SERPL-SCNC: 106 MMOL/L (ref 96–108)
CHOLEST SERPL-MCNC: 183 MG/DL
CO2 SERPL-SCNC: 26 MMOL/L (ref 21–32)
CREAT SERPL-MCNC: 1.05 MG/DL (ref 0.6–1.3)
EOSINOPHIL # BLD AUTO: 0.11 THOUSAND/ΜL (ref 0–0.61)
EOSINOPHIL NFR BLD AUTO: 1 % (ref 0–6)
ERYTHROCYTE [DISTWIDTH] IN BLOOD BY AUTOMATED COUNT: 12.4 % (ref 11.6–15.1)
GFR SERPL CREATININE-BSD FRML MDRD: 87 ML/MIN/1.73SQ M
GLUCOSE P FAST SERPL-MCNC: 102 MG/DL (ref 65–99)
HCT VFR BLD AUTO: 47.4 % (ref 36.5–49.3)
HCV AB SER QL: NORMAL
HDLC SERPL-MCNC: 43 MG/DL
HGB BLD-MCNC: 15.7 G/DL (ref 12–17)
IMM GRANULOCYTES # BLD AUTO: 0.01 THOUSAND/UL (ref 0–0.2)
IMM GRANULOCYTES NFR BLD AUTO: 0 % (ref 0–2)
LDLC SERPL CALC-MCNC: 112 MG/DL (ref 0–100)
LYMPHOCYTES # BLD AUTO: 2.94 THOUSANDS/ΜL (ref 0.6–4.47)
LYMPHOCYTES NFR BLD AUTO: 36 % (ref 14–44)
MCH RBC QN AUTO: 31.3 PG (ref 26.8–34.3)
MCHC RBC AUTO-ENTMCNC: 33.1 G/DL (ref 31.4–37.4)
MCV RBC AUTO: 94 FL (ref 82–98)
MONOCYTES # BLD AUTO: 0.52 THOUSAND/ΜL (ref 0.17–1.22)
MONOCYTES NFR BLD AUTO: 6 % (ref 4–12)
NEUTROPHILS # BLD AUTO: 4.57 THOUSANDS/ΜL (ref 1.85–7.62)
NEUTS SEG NFR BLD AUTO: 57 % (ref 43–75)
NRBC BLD AUTO-RTO: 0 /100 WBCS
PLATELET # BLD AUTO: 257 THOUSANDS/UL (ref 149–390)
PMV BLD AUTO: 10.1 FL (ref 8.9–12.7)
POTASSIUM SERPL-SCNC: 3.9 MMOL/L (ref 3.5–5.3)
PROT SERPL-MCNC: 7.1 G/DL (ref 6.4–8.4)
RBC # BLD AUTO: 5.02 MILLION/UL (ref 3.88–5.62)
SODIUM SERPL-SCNC: 140 MMOL/L (ref 135–147)
TRIGL SERPL-MCNC: 139 MG/DL
TSH SERPL DL<=0.05 MIU/L-ACNC: 0.89 UIU/ML (ref 0.45–4.5)
WBC # BLD AUTO: 8.18 THOUSAND/UL (ref 4.31–10.16)

## 2022-07-20 PROCEDURE — 80053 COMPREHEN METABOLIC PANEL: CPT

## 2022-07-20 PROCEDURE — 85025 COMPLETE CBC W/AUTO DIFF WBC: CPT

## 2022-07-20 PROCEDURE — 80061 LIPID PANEL: CPT

## 2022-07-20 PROCEDURE — 86803 HEPATITIS C AB TEST: CPT

## 2022-07-20 PROCEDURE — 84443 ASSAY THYROID STIM HORMONE: CPT

## 2022-07-20 PROCEDURE — 36415 COLL VENOUS BLD VENIPUNCTURE: CPT

## 2022-07-22 ENCOUNTER — OFFICE VISIT (OUTPATIENT)
Dept: FAMILY MEDICINE CLINIC | Facility: CLINIC | Age: 42
End: 2022-07-22
Payer: COMMERCIAL

## 2022-07-22 VITALS
BODY MASS INDEX: 36 KG/M2 | OXYGEN SATURATION: 99 % | HEIGHT: 66 IN | WEIGHT: 224 LBS | DIASTOLIC BLOOD PRESSURE: 80 MMHG | SYSTOLIC BLOOD PRESSURE: 104 MMHG | HEART RATE: 87 BPM

## 2022-07-22 DIAGNOSIS — Z23 ENCOUNTER FOR IMMUNIZATION: ICD-10-CM

## 2022-07-22 DIAGNOSIS — K52.9 FREQUENT STOOLS: ICD-10-CM

## 2022-07-22 DIAGNOSIS — Z00.00 ANNUAL PHYSICAL EXAM: Primary | ICD-10-CM

## 2022-07-22 DIAGNOSIS — R73.01 IMPAIRED FASTING GLUCOSE: ICD-10-CM

## 2022-07-22 DIAGNOSIS — E66.9 OBESITY (BMI 30-39.9): ICD-10-CM

## 2022-07-22 DIAGNOSIS — I10 ESSENTIAL (PRIMARY) HYPERTENSION: ICD-10-CM

## 2022-07-22 DIAGNOSIS — Z72.0 TOBACCO USE: ICD-10-CM

## 2022-07-22 LAB — SL AMB POCT HEMOGLOBIN AIC: 5.2 (ref ?–6.5)

## 2022-07-22 PROCEDURE — 83036 HEMOGLOBIN GLYCOSYLATED A1C: CPT | Performed by: PHYSICIAN ASSISTANT

## 2022-07-22 PROCEDURE — 90472 IMMUNIZATION ADMIN EACH ADD: CPT

## 2022-07-22 PROCEDURE — 3074F SYST BP LT 130 MM HG: CPT | Performed by: PHYSICIAN ASSISTANT

## 2022-07-22 PROCEDURE — 3725F SCREEN DEPRESSION PERFORMED: CPT | Performed by: PHYSICIAN ASSISTANT

## 2022-07-22 PROCEDURE — 3079F DIAST BP 80-89 MM HG: CPT | Performed by: PHYSICIAN ASSISTANT

## 2022-07-22 PROCEDURE — 90471 IMMUNIZATION ADMIN: CPT

## 2022-07-22 PROCEDURE — 99396 PREV VISIT EST AGE 40-64: CPT | Performed by: PHYSICIAN ASSISTANT

## 2022-07-22 PROCEDURE — 90715 TDAP VACCINE 7 YRS/> IM: CPT

## 2022-07-22 PROCEDURE — 90677 PCV20 VACCINE IM: CPT

## 2022-07-22 NOTE — PATIENT INSTRUCTIONS

## 2022-07-22 NOTE — PROGRESS NOTES
ADULT ANNUAL [facility]    NAME: Kayla Ramirez  AGE: 43 y o  SEX: male  : 1980     DATE: 2022     Assessment and Plan:     Problem List Items Addressed This Visit        Endocrine    Impaired fasting glucose     We reviewed recent labs showed elevated fasting glucose of 102  A1c today was normal at 5 2%  Relevant Orders    POCT hemoglobin A1c (Completed)       Cardiovascular and Mediastinum    Essential (primary) hypertension     Well-controlled  Continue consistent use of lisinopril 20 mg daily  Other    Tobacco use     Tobacco Cessation Counseling: Tobacco cessation counseling and education was provided  The patient is sincerely urged to quit consumption of tobacco  He is not ready to quit tobacco  The numerous health risks of tobacco consumption were discussed  If he decides to quit, there are a number of helpful adjunctive aids, and he can see me to discuss nicotine replacement therapy, chantix, or bupropion anytime in the future  Obesity (BMI 30-39  9)     BMI Counseling: Body mass index is 36 71 kg/m²  The BMI is above normal  Nutrition recommendations include 3-5 servings of fruits/vegetables daily and reducing intake of saturated fat and trans fat  Exercise recommendations include moderate aerobic physical activity for 150 minutes/week and exercising 3-5 times per week  Frequent stools     Patient will check labs for celiac disease and food allergies  Relevant Orders    Food Allergy Profile    Celiac Disease Antibody Profile      Other Visit Diagnoses     Annual physical exam    -  Primary    Encounter for immunization        Relevant Orders    TDAP VACCINE GREATER THAN OR EQUAL TO 6YO IM (Completed)    Pneumococcal Conjugate Vaccine 20-valent (Pcv20) (Completed)          Immunizations and preventive care screenings were discussed with patient today   Appropriate education was printed on patient's after visit summary  Counseling:  Alcohol/drug use: discussed moderation in alcohol intake/the recommendations for healthy alcohol use  · Exercise: the importance of regular exercise/physical activity was discussed  Recommend exercise 3-5 times per week for at least 30 minutes  Return in about 1 year (around 7/22/2023) for Annual physical      Chief Complaint:     Chief Complaint   Patient presents with    Physical Exam      History of Present Illness:     Adult Annual Physical   Patient here for a comprehensive physical exam  The patient reports problems - as below  The patient reports that current blood pressure medications include lisinopril 20 mg daily  Blood pressure readings at home are not checked  The patient denies symptoms of poor control such as chest pain, shortness of breath, leg swelling, vision changes, headaches, or dizziness  The patient reports 1 20 oz bottle of soda daily for caffeine intake and limited salt intake  He is still smoking 1/2 ppd  He is interested in quitting soon - used patches with benefit for 2 weeks in the past before got stressed  Diet and Physical Activity  · Diet/Nutrition: poor diet, high fat diet and limited fruits/vegetables  · Exercise: walking and at work  Depression Screening  PHQ-2/9 Depression Screening    Little interest or pleasure in doing things: 0 - not at all  Feeling down, depressed, or hopeless: 0 - not at all  PHQ-2 Score: 0  PHQ-2 Interpretation: Negative depression screen       General Health   · Sleep: sleeps well and 6-7 hours a night  · Hearing: normal - bilateral   · Vision: most recent eye exam >1 year ago and wore glasses as a child  · Dental: regular dental visits   Health  · Symptoms include: none     Review of Systems:     Review of Systems   Constitutional: Negative for chills and fever  HENT: Negative for rhinorrhea and sore throat      Eyes: Negative for visual disturbance  Respiratory: Negative for cough, shortness of breath and wheezing  Cardiovascular: Negative for chest pain, palpitations and leg swelling  Gastrointestinal: Negative for abdominal pain, constipation, diarrhea, nausea and vomiting  Has BM everytime he eats   Endocrine: Negative for polydipsia and polyuria  Genitourinary: Positive for frequency (but drinks a lot of water)  Negative for dysuria  Musculoskeletal: Negative for arthralgias and myalgias  Occasional feet pain   Skin: Negative for rash  Neurological: Negative for dizziness, syncope and headaches  Hematological: Does not bruise/bleed easily  Psychiatric/Behavioral: Negative for dysphoric mood  The patient is nervous/anxious (no panic attacks lately)         Past Medical History:     Past Medical History:   Diagnosis Date    Abnormal LFTs     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 [facility]    Rectal hemorrhage       Past Surgical History:     Past Surgical History:   Procedure Laterality Date    SKIN SURGERY Right     cyst excised from under right eye - early 2000s    VASECTOMY      Vas Deferens      Family History:     Family History   Problem Relation Age of Onset    Hiatal hernia Mother     Hypertension Mother     Skin cancer Mother         melanoma    Colon polyps Mother         Of the sigmoid colon    Diabetes Father     Hypertension Brother     Colon cancer Maternal Grandmother     Diabetes Maternal Uncle       Social History:     Social History     Socioeconomic History    Marital status: /Civil Union     Spouse name: None    Number of children: None    Years of education: None    Highest education level: None   Occupational History    None   Tobacco Use    Smoking status: Current Every Day Smoker     Packs/day: 0 50     Start date: 56    Smokeless tobacco: Never Used    Tobacco comment: tobacco use - has cut from 2 ppd almost when was depressed   Vaping Use    Vaping Use: Never used   Substance and Sexual Activity    Alcohol use: Yes     Comment: Being a social drinker - averages 5-6 drinks on Saturdays    Drug use: No    Sexual activity: None   Other Topics Concern    None   Social History Narrative    None     Social Determinants of Health     Financial Resource Strain: Not on file   Food Insecurity: Not on file   Transportation Needs: Not on file   Physical Activity: Not on file   Stress: Not on file   Social Connections: Not on file   Intimate Partner Violence: Not on file   Housing Stability: Not on file      Current Medications:     Current Outpatient Medications   Medication Sig Dispense Refill    lisinopril (ZESTRIL) 20 mg tablet TAKE 1 TABLET BY MOUTH EVERY DAY 30 tablet 1     No current facility-administered medications for this visit  Allergies:     No Known Allergies   Physical Exam:     /80 (BP Location: Left arm, Patient Position: Sitting, Cuff Size: Large)   Pulse 87   Ht 5' 5 5" (1 664 m)   Wt 102 kg (224 lb)   SpO2 99%   BMI 36 71 kg/m²     Physical Exam  Vitals reviewed  Constitutional:       General: He is not in acute distress  Appearance: Normal appearance  He is well-developed  He is obese  He is not ill-appearing  HENT:      Head: Normocephalic and atraumatic  Right Ear: Tympanic membrane, ear canal and external ear normal  There is no impacted cerumen  Left Ear: Tympanic membrane, ear canal and external ear normal  There is no impacted cerumen  Nose: Nose normal  No congestion  Mouth/Throat:      Mouth: Mucous membranes are moist       Pharynx: No oropharyngeal exudate or posterior oropharyngeal erythema  Eyes:      Conjunctiva/sclera: Conjunctivae normal       Pupils: Pupils are equal, round, and reactive to light  Neck:      Thyroid: No thyromegaly  Vascular: No carotid bruit     Cardiovascular:      Rate and Rhythm: Normal rate and regular rhythm  Pulses: Normal pulses  Heart sounds: Normal heart sounds  No murmur heard  Pulmonary:      Effort: Pulmonary effort is normal       Breath sounds: Normal breath sounds  No wheezing, rhonchi or rales  Abdominal:      General: Bowel sounds are normal  There is no distension  Palpations: Abdomen is soft  There is no mass  Tenderness: There is no abdominal tenderness  Musculoskeletal:      Cervical back: Normal range of motion and neck supple  Right lower leg: No edema  Left lower leg: No edema  Lymphadenopathy:      Cervical: No cervical adenopathy  Skin:     General: Skin is warm and dry  Findings: No rash  Neurological:      Mental Status: He is alert  Sensory: No sensory deficit  Psychiatric:         Mood and Affect: Mood normal          Behavior: Behavior normal          Thought Content:  Thought content normal          Judgment: Judgment normal           Sasha Sheets PA-C  [facility] PRIMARY [facility]

## 2022-07-24 PROBLEM — R73.01 IMPAIRED FASTING GLUCOSE: Status: ACTIVE | Noted: 2022-07-24

## 2022-07-24 PROBLEM — K52.9 FREQUENT STOOLS: Status: ACTIVE | Noted: 2022-07-24

## 2022-07-24 NOTE — ASSESSMENT & PLAN NOTE
BMI Counseling: Body mass index is 36 71 kg/m²  The BMI is above normal  Nutrition recommendations include 3-5 servings of fruits/vegetables daily and reducing intake of saturated fat and trans fat  Exercise recommendations include moderate aerobic physical activity for 150 minutes/week and exercising 3-5 times per week

## 2022-08-10 DIAGNOSIS — I10 ESSENTIAL (PRIMARY) HYPERTENSION: ICD-10-CM

## 2022-08-10 RX ORDER — LISINOPRIL 20 MG/1
TABLET ORAL
Qty: 30 TABLET | Refills: 1 | Status: SHIPPED | OUTPATIENT
Start: 2022-08-10 | End: 2022-10-22

## 2022-10-22 DIAGNOSIS — I10 ESSENTIAL (PRIMARY) HYPERTENSION: ICD-10-CM

## 2022-10-22 RX ORDER — LISINOPRIL 20 MG/1
TABLET ORAL
Qty: 30 TABLET | Refills: 1 | Status: SHIPPED | OUTPATIENT
Start: 2022-10-22

## 2023-01-07 DIAGNOSIS — I10 ESSENTIAL (PRIMARY) HYPERTENSION: ICD-10-CM

## 2023-01-07 RX ORDER — LISINOPRIL 20 MG/1
TABLET ORAL
Qty: 30 TABLET | Refills: 1 | Status: SHIPPED | OUTPATIENT
Start: 2023-01-07

## 2023-07-28 ENCOUNTER — OFFICE VISIT (OUTPATIENT)
Dept: FAMILY MEDICINE CLINIC | Facility: CLINIC | Age: 43
End: 2023-07-28
Payer: COMMERCIAL

## 2023-07-28 VITALS
SYSTOLIC BLOOD PRESSURE: 120 MMHG | RESPIRATION RATE: 12 BRPM | HEART RATE: 97 BPM | HEIGHT: 66 IN | TEMPERATURE: 98.3 F | OXYGEN SATURATION: 97 % | DIASTOLIC BLOOD PRESSURE: 78 MMHG | BODY MASS INDEX: 35.84 KG/M2 | WEIGHT: 223 LBS

## 2023-07-28 DIAGNOSIS — F41.9 ANXIETY: ICD-10-CM

## 2023-07-28 DIAGNOSIS — Z00.00 ANNUAL PHYSICAL EXAM: Primary | ICD-10-CM

## 2023-07-28 DIAGNOSIS — R73.01 IMPAIRED FASTING GLUCOSE: ICD-10-CM

## 2023-07-28 DIAGNOSIS — Z72.0 TOBACCO USE: ICD-10-CM

## 2023-07-28 DIAGNOSIS — E66.9 CLASS 2 OBESITY WITH BODY MASS INDEX (BMI) OF 35.0 TO 35.9 IN ADULT, UNSPECIFIED OBESITY TYPE, UNSPECIFIED WHETHER SERIOUS COMORBIDITY PRESENT: ICD-10-CM

## 2023-07-28 DIAGNOSIS — I10 ESSENTIAL (PRIMARY) HYPERTENSION: ICD-10-CM

## 2023-07-28 PROCEDURE — 99396 PREV VISIT EST AGE 40-64: CPT | Performed by: PHYSICIAN ASSISTANT

## 2023-07-28 RX ORDER — LISINOPRIL 20 MG/1
20 TABLET ORAL DAILY
Qty: 30 TABLET | Refills: 5 | Status: SHIPPED | OUTPATIENT
Start: 2023-07-28

## 2023-07-28 RX ORDER — ESCITALOPRAM OXALATE 10 MG/1
TABLET ORAL
Qty: 30 TABLET | Refills: 2 | Status: SHIPPED | OUTPATIENT
Start: 2023-07-28

## 2023-07-28 NOTE — PROGRESS NOTES
ADULT ANNUAL [address] [facility]IMARY CARE    NAME: York Meckel  AGE: 37 y.o. SEX: male  : 1980     DATE: 2023     Assessment and Plan:     Problem List Items Addressed This Visit        Endocrine    Impaired fasting glucose     Encouraged to work on diet. Recheck with labs as ordered. (Additional labs provided for next year.)         Relevant Orders    Comprehensive metabolic panel    Hemoglobin A1C    CBC and differential    Comprehensive metabolic panel    Hemoglobin A1C    Lipid Panel with Direct LDL reflex    TSH, 3rd generation with Free T4 reflex       Cardiovascular and Mediastinum    Essential (primary) hypertension     Well-controlled. Continue lisinopril 20 mg daily. Relevant Medications    lisinopril (ZESTRIL) 20 mg tablet    Other Relevant Orders    CBC and differential    Comprehensive metabolic panel    Lipid Panel with Direct LDL reflex    TSH, 3rd generation with Free T4 reflex    CBC and differential    Comprehensive metabolic panel    Hemoglobin A1C    Lipid Panel with Direct LDL reflex    TSH, 3rd generation with Free T4 reflex       Other    Anxiety     Patient notes his anxiety has flared since the passing of his mother in 2022. He will restart Lexapro 10 mg daily. He declined referral to a therapist. He will follow-up in 6 weeks. He was encouraged to call with concerns in the interim. Relevant Medications    escitalopram (LEXAPRO) 10 mg tablet    Other Relevant Orders    CBC and differential    Comprehensive metabolic panel    Hemoglobin A1C    Lipid Panel with Direct LDL reflex    TSH, 3rd generation with Free T4 reflex    Tobacco use     Tobacco Cessation Counseling: Tobacco cessation counseling and education was provided. The patient is sincerely urged to quit consumption of tobacco. He is not ready to quit tobacco. The numerous health risks of tobacco consumption were discussed.  If he decides to quit, there are a number of helpful adjunctive aids, and he can see me to discuss nicotine replacement therapy, chantix, or bupropion anytime in the future. Class 2 obesity with body mass index (BMI) of 35.0 to 35.9 in adult, unspecified obesity type, unspecified whether serious comorbidity present     BMI Counseling: Body mass index is 35.99 kg/m². The BMI is above normal. Nutrition recommendations include 3-5 servings of fruits/vegetables daily and reducing fast food intake. Exercise recommendations include moderate aerobic physical activity for 150 minutes/week and exercising 3-5 times per week. Relevant Orders    CBC and differential    Comprehensive metabolic panel    Hemoglobin A1C    Lipid Panel with Direct LDL reflex    TSH, 3rd generation with Free T4 reflex    CBC and differential    Comprehensive metabolic panel    Hemoglobin A1C    Lipid Panel with Direct LDL reflex    TSH, 3rd generation with Free T4 reflex   Other Visit Diagnoses     Annual physical exam    -  Primary    Relevant Orders    CBC and differential    Comprehensive metabolic panel    Hemoglobin A1C    Lipid Panel with Direct LDL reflex    TSH, 3rd generation with Free T4 reflex          Immunizations and preventive care screenings were discussed with patient today. Appropriate education was printed on patient's after visit summary. Counseling:  · Exercise: the importance of regular exercise/physical activity was discussed. Recommend exercise 3-5 times per week for at least 30 minutes. Depression Screening and Follow-up Plan: Patient was screened for depression during today's encounter. They screened negative with a PHQ-2 score of 0. Return in about 6 weeks (around 9/8/2023) for Recheck.      Chief Complaint:     Chief Complaint   Patient presents with   • Physical Exam      History of Present Illness:     Adult Annual Physical   Patient here for a comprehensive physical exam. The patient reports problems - as below. IFG-last labs showed A1c of 5.2% last year     Hypertension-on lisinopril 20 mg daily    Tobacco use - up to 1.5 ppd     Feels like he wants to restart Lexapro - having trouble with thinking about death a lot since mother passed in September -  pf leukemia and sepsis      Diet and Physical Activity  · Diet/Nutrition: poor lately - not many fruits/veggies - orders out often. · Exercise: walking at work. Depression Screening  PHQ-2/9 Depression Screening    Little interest or pleasure in doing things: 0 - not at all  Feeling down, depressed, or hopeless: 0 - not at all  Trouble falling or staying asleep, or sleeping too much: 0 - not at all  Feeling tired or having little energy: 0 - not at all  Poor appetite or overeatin - not at all  Feeling bad about yourself - or that you are a failure or have let yourself or your family down: 0 - not at all  Trouble concentrating on things, such as reading the newspaper or watching television: 0 - not at all  Moving or speaking so slowly that other people could have noticed. Or the opposite - being so fidgety or restless that you have been moving around a lot more than usual: 0 - not at all  Thoughts that you would be better off dead, or of hurting yourself in some way: 0 - not at all  PHQ-2 Score: 0  PHQ-2 Interpretation: Negative depression screen  PHQ-9 Score: 0   PHQ-9 Interpretation: No or Minimal depression        General Health  · Sleep: averages 6-7 hours a night and usually rested upon waking. · Hearing: normal - bilateral.  · Vision: most recent eye exam >1 year ago. · Dental: regular dental visits.  Health  · Symptoms include: urinary frequency     Review of Systems:     Review of Systems   Constitutional: Negative for chills and fever. HENT: Negative for congestion, rhinorrhea and sore throat. Eyes: Negative for visual disturbance. Respiratory: Negative for cough, shortness of breath and wheezing. Cardiovascular: Negative for chest pain, palpitations and leg swelling. Gastrointestinal: Negative for abdominal pain, blood in stool, constipation, diarrhea, nausea and vomiting. Endocrine: Negative for polydipsia and polyuria. Genitourinary: Positive for frequency. Negative for dysuria. Musculoskeletal: Negative for arthralgias and myalgias. Skin: Negative for rash. Neurological: Negative for dizziness, syncope and headaches. Hematological: Does not bruise/bleed easily. Psychiatric/Behavioral: Negative for dysphoric mood. The patient is nervous/anxious.        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 without prior episode ([address]) 11/6/2018   • Rectal hemorrhage       Past Surgical History:     Past Surgical History:   Procedure Laterality Date   • SKIN SURGERY Right     cyst excised from under right eye - early 2000s   • VASECTOMY      Vas Deferens      Family History:     Family History   Problem Relation Age of Onset   • Hiatal hernia Mother    • Hypertension Mother    • Skin cancer Mother         melanoma   • Colon polyps Mother         Of the sigmoid colon   • Leukemia Mother    • Diabetes Father    • Hypertension Brother    • Colon cancer Maternal Grandmother    • Diabetes Maternal Uncle       Social History:     Social History     Socioeconomic History   • Marital status: /Civil Union     Spouse name: None   • Number of children: None   • Years of education: None   • Highest education level: None   Occupational History   • None   Tobacco Use   • Smoking status: Every Day     Packs/day: 1.50     Types: Cigarettes     Start date: 1996   • Smokeless tobacco: Never   • Tobacco comments:     tobacco use - has cut from 2 ppd almost when was depressed - as of 7/2023, smoking about 1.5 ppd   Vaping Use   • Vaping Use: Never used   Substance and Sexual Activity   • Alcohol use: Yes     Comment: Being a social drinker - averages 5-6 drinks up to 3 times weekly   • Drug use: No   • Sexual activity: None   Other Topics Concern   • None   Social History Narrative   • None     Social Determinants of Health     Financial Resource Strain: Not on file   Food Insecurity: Not on file   Transportation Needs: Not on file   Physical Activity: Not on file   Stress: Not on file   Social Connections: Not on file   Intimate Partner Violence: Not on file   Housing Stability: Not on file      Current Medications:     Current Outpatient Medications   Medication Sig Dispense Refill   • escitalopram (LEXAPRO) 10 mg tablet Take 1/2 tablet daily x 1 week and then increase to 1 tablet daily by mouth. 30 tablet 2   • lisinopril (ZESTRIL) 20 mg tablet Take 1 tablet (20 mg total) by mouth daily 30 tablet 5     No current facility-administered medications for this visit. Allergies:     No Known Allergies   Physical Exam:     /78 (BP Location: Left arm, Patient Position: Sitting, Cuff Size: Large)   Pulse 97   Temp 98.3 °F (36.8 °C) (Temporal)   Resp 12   Ht 5' 6" (1.676 m)   Wt 101 kg (223 lb)   SpO2 97%   BMI 35.99 kg/m²     Physical Exam  Vitals reviewed. Constitutional:       General: He is not in acute distress. Appearance: Normal appearance. He is well-developed. He is obese. He is not ill-appearing. HENT:      Head: Normocephalic and atraumatic. Right Ear: Tympanic membrane, ear canal and external ear normal. There is no impacted cerumen. Left Ear: Tympanic membrane, ear canal and external ear normal. There is no impacted cerumen. Nose: Nose normal. No congestion. Mouth/Throat:      Mouth: Mucous membranes are moist.      Pharynx: No oropharyngeal exudate or posterior oropharyngeal erythema. Eyes:      Conjunctiva/sclera: Conjunctivae normal.      Pupils: Pupils are equal, round, and reactive to light. Neck:      Thyroid: No thyromegaly. Cardiovascular:      Rate and Rhythm: Normal rate and regular rhythm. Pulses: Normal pulses. Heart sounds: Normal heart sounds. No murmur heard. Pulmonary:      Effort: Pulmonary effort is normal.      Breath sounds: Normal breath sounds. No wheezing, rhonchi or rales. Abdominal:      General: Bowel sounds are normal. There is no distension. Palpations: Abdomen is soft. There is no mass. Tenderness: There is no abdominal tenderness. Musculoskeletal:      Cervical back: Normal range of motion and neck supple. Right lower leg: No edema. Left lower leg: No edema. Lymphadenopathy:      Cervical: No cervical adenopathy. Skin:     General: Skin is warm and dry. Findings: No rash. Neurological:      Mental Status: He is alert. Sensory: No sensory deficit. Motor: No weakness. Psychiatric:         Mood and Affect: Mood is depressed. Affect is tearful. Behavior: Behavior normal.         Thought Content:  Thought content normal.         Judgment: Judgment normal.          Roberto Herrera PA-C  [facility]

## 2023-07-28 NOTE — ASSESSMENT & PLAN NOTE
Encouraged to work on diet. Recheck with labs as ordered.  (Additional labs provided for next year.)

## 2023-07-28 NOTE — ASSESSMENT & PLAN NOTE
BMI Counseling: Body mass index is 35.99 kg/m². The BMI is above normal. Nutrition recommendations include 3-5 servings of fruits/vegetables daily and reducing fast food intake. Exercise recommendations include moderate aerobic physical activity for 150 minutes/week and exercising 3-5 times per week.

## 2023-07-28 NOTE — ASSESSMENT & PLAN NOTE
Patient notes his anxiety has flared since the passing of his mother in 9/2022. He will restart Lexapro 10 mg daily. He declined referral to a therapist. He will follow-up in 6 weeks. He was encouraged to call with concerns in the interim.

## 2023-07-28 NOTE — ASSESSMENT & PLAN NOTE
Tobacco Cessation Counseling: Tobacco cessation counseling and education was provided. The patient is sincerely urged to quit consumption of tobacco. He is not ready to quit tobacco. The numerous health risks of tobacco consumption were discussed. If he decides to quit, there are a number of helpful adjunctive aids, and he can see me to discuss nicotine replacement therapy, chantix, or bupropion anytime in the future.

## 2023-09-14 ENCOUNTER — APPOINTMENT (OUTPATIENT)
Dept: LAB | Facility: CLINIC | Age: 43
End: 2023-09-14
Payer: COMMERCIAL

## 2023-09-14 DIAGNOSIS — E66.9 CLASS 2 OBESITY WITH BODY MASS INDEX (BMI) OF 35.0 TO 35.9 IN ADULT, UNSPECIFIED OBESITY TYPE, UNSPECIFIED WHETHER SERIOUS COMORBIDITY PRESENT: ICD-10-CM

## 2023-09-14 DIAGNOSIS — Z00.00 ANNUAL PHYSICAL EXAM: ICD-10-CM

## 2023-09-14 DIAGNOSIS — I10 ESSENTIAL (PRIMARY) HYPERTENSION: ICD-10-CM

## 2023-09-14 DIAGNOSIS — F41.9 ANXIETY: ICD-10-CM

## 2023-09-14 DIAGNOSIS — R73.01 IMPAIRED FASTING GLUCOSE: ICD-10-CM

## 2023-09-14 LAB
ALBUMIN SERPL BCP-MCNC: 4.5 G/DL (ref 3.5–5)
ALP SERPL-CCNC: 83 U/L (ref 34–104)
ALT SERPL W P-5'-P-CCNC: 43 U/L (ref 7–52)
ANION GAP SERPL CALCULATED.3IONS-SCNC: 13 MMOL/L
AST SERPL W P-5'-P-CCNC: 21 U/L (ref 13–39)
BASOPHILS # BLD AUTO: 0.04 THOUSANDS/ÂΜL (ref 0–0.1)
BASOPHILS NFR BLD AUTO: 1 % (ref 0–1)
BILIRUB SERPL-MCNC: 1.34 MG/DL (ref 0.2–1)
BUN SERPL-MCNC: 13 MG/DL (ref 5–25)
CALCIUM SERPL-MCNC: 9.5 MG/DL (ref 8.4–10.2)
CHLORIDE SERPL-SCNC: 103 MMOL/L (ref 96–108)
CHOLEST SERPL-MCNC: 220 MG/DL
CO2 SERPL-SCNC: 22 MMOL/L (ref 21–32)
CREAT SERPL-MCNC: 0.89 MG/DL (ref 0.6–1.3)
EOSINOPHIL # BLD AUTO: 0.13 THOUSAND/ÂΜL (ref 0–0.61)
EOSINOPHIL NFR BLD AUTO: 2 % (ref 0–6)
ERYTHROCYTE [DISTWIDTH] IN BLOOD BY AUTOMATED COUNT: 12.4 % (ref 11.6–15.1)
EST. AVERAGE GLUCOSE BLD GHB EST-MCNC: 103 MG/DL
GFR SERPL CREATININE-BSD FRML MDRD: 104 ML/MIN/1.73SQ M
GLUCOSE P FAST SERPL-MCNC: 122 MG/DL (ref 65–99)
HBA1C MFR BLD: 5.2 %
HCT VFR BLD AUTO: 48 % (ref 36.5–49.3)
HDLC SERPL-MCNC: 47 MG/DL
HGB BLD-MCNC: 16.3 G/DL (ref 12–17)
IMM GRANULOCYTES # BLD AUTO: 0.03 THOUSAND/UL (ref 0–0.2)
IMM GRANULOCYTES NFR BLD AUTO: 0 % (ref 0–2)
LDLC SERPL CALC-MCNC: 140 MG/DL (ref 0–100)
LYMPHOCYTES # BLD AUTO: 2.97 THOUSANDS/ÂΜL (ref 0.6–4.47)
LYMPHOCYTES NFR BLD AUTO: 38 % (ref 14–44)
MCH RBC QN AUTO: 31.4 PG (ref 26.8–34.3)
MCHC RBC AUTO-ENTMCNC: 34 G/DL (ref 31.4–37.4)
MCV RBC AUTO: 93 FL (ref 82–98)
MONOCYTES # BLD AUTO: 0.55 THOUSAND/ÂΜL (ref 0.17–1.22)
MONOCYTES NFR BLD AUTO: 7 % (ref 4–12)
NEUTROPHILS # BLD AUTO: 4.2 THOUSANDS/ÂΜL (ref 1.85–7.62)
NEUTS SEG NFR BLD AUTO: 52 % (ref 43–75)
NRBC BLD AUTO-RTO: 0 /100 WBCS
PLATELET # BLD AUTO: 258 THOUSANDS/UL (ref 149–390)
PMV BLD AUTO: 10.9 FL (ref 8.9–12.7)
POTASSIUM SERPL-SCNC: 3.7 MMOL/L (ref 3.5–5.3)
PROT SERPL-MCNC: 6.9 G/DL (ref 6.4–8.4)
RBC # BLD AUTO: 5.19 MILLION/UL (ref 3.88–5.62)
SODIUM SERPL-SCNC: 138 MMOL/L (ref 135–147)
TRIGL SERPL-MCNC: 164 MG/DL
TSH SERPL DL<=0.05 MIU/L-ACNC: 0.82 UIU/ML (ref 0.45–4.5)
WBC # BLD AUTO: 7.92 THOUSAND/UL (ref 4.31–10.16)

## 2023-09-14 PROCEDURE — 83036 HEMOGLOBIN GLYCOSYLATED A1C: CPT

## 2023-09-14 PROCEDURE — 80061 LIPID PANEL: CPT

## 2023-09-14 PROCEDURE — 36415 COLL VENOUS BLD VENIPUNCTURE: CPT

## 2023-09-14 PROCEDURE — 85025 COMPLETE CBC W/AUTO DIFF WBC: CPT

## 2023-09-14 PROCEDURE — 84443 ASSAY THYROID STIM HORMONE: CPT

## 2023-09-14 PROCEDURE — 80053 COMPREHEN METABOLIC PANEL: CPT

## 2023-09-15 ENCOUNTER — OFFICE VISIT (OUTPATIENT)
Dept: FAMILY MEDICINE CLINIC | Facility: CLINIC | Age: 43
End: 2023-09-15
Payer: COMMERCIAL

## 2023-09-15 VITALS
DIASTOLIC BLOOD PRESSURE: 88 MMHG | OXYGEN SATURATION: 97 % | SYSTOLIC BLOOD PRESSURE: 134 MMHG | WEIGHT: 227.6 LBS | HEART RATE: 97 BPM | BODY MASS INDEX: 36.58 KG/M2 | TEMPERATURE: 97 F | RESPIRATION RATE: 20 BRPM | HEIGHT: 66 IN

## 2023-09-15 DIAGNOSIS — Z72.0 TOBACCO USE: ICD-10-CM

## 2023-09-15 DIAGNOSIS — I10 ESSENTIAL (PRIMARY) HYPERTENSION: ICD-10-CM

## 2023-09-15 DIAGNOSIS — F41.9 ANXIETY: Primary | ICD-10-CM

## 2023-09-15 DIAGNOSIS — R73.01 IMPAIRED FASTING GLUCOSE: ICD-10-CM

## 2023-09-15 DIAGNOSIS — E78.5 HYPERLIPIDEMIA, UNSPECIFIED HYPERLIPIDEMIA TYPE: ICD-10-CM

## 2023-09-15 PROCEDURE — 99214 OFFICE O/P EST MOD 30 MIN: CPT | Performed by: PHYSICIAN ASSISTANT

## 2023-09-15 RX ORDER — LISINOPRIL 20 MG/1
20 TABLET ORAL DAILY
Qty: 30 TABLET | Refills: 5 | Status: SHIPPED | OUTPATIENT
Start: 2023-09-15

## 2023-09-15 NOTE — ASSESSMENT & PLAN NOTE
Borderline today. Continue lisinopril 20 mg daily. Cut back on smoking which has increased recently. Recheck next visit.

## 2023-09-15 NOTE — PROGRESS NOTES
FAMILY PRACTICE OFFICE VISIT  [facility] - [address] [facility]ARE       NAME: Ling Robles  AGE: 37 y.o. SEX: male       : 1980        MRN: [id number]    DATE: 9/15/2023  TIME: 8:46 AM    Assessment and Plan     Problem List Items Addressed This Visit        Endocrine    Impaired fasting glucose     With normal A1c. Encouraged to limit carb intake. Will continue to monitor. Relevant Orders    Comprehensive metabolic panel    Hemoglobin A1C       Cardiovascular and Mediastinum    Essential (primary) hypertension     Borderline today. Continue lisinopril 20 mg daily. Cut back on smoking which has increased recently. Recheck next visit. Relevant Medications    lisinopril (ZESTRIL) 20 mg tablet    Other Relevant Orders    Comprehensive metabolic panel       Other    Anxiety - Primary     Improved. Continue with Lexapro 10 mg daily. Tobacco use     Tobacco Cessation Counseling: Tobacco cessation counseling and education was provided. The patient is sincerely urged to quit consumption of tobacco. He is not ready to quit tobacco. The numerous health risks of tobacco consumption were discussed. If he decides to quit, there are a number of helpful adjunctive aids, and he can see me to discuss nicotine replacement therapy, chantix, or bupropion anytime in the future. Hyperlipidemia     Reviewed the increase on recent labs. Encouraged to decrease intake of fatty food. Recheck in 6 months. Relevant Orders    Lipid Panel with Direct LDL reflex       Impaired fasting glucose  With normal A1c. Encouraged to limit carb intake. Will continue to monitor. Essential (primary) hypertension  Borderline today. Continue lisinopril 20 mg daily. Cut back on smoking which has increased recently. Recheck next visit. Anxiety  Improved. Continue with Lexapro 10 mg daily.      Tobacco use  Tobacco Cessation Counseling: Tobacco cessation counseling and education was provided. The patient is sincerely urged to quit consumption of tobacco. He is not ready to quit tobacco. The numerous health risks of tobacco consumption were discussed. If he decides to quit, there are a number of helpful adjunctive aids, and he can see me to discuss nicotine replacement therapy, chantix, or bupropion anytime in the future. Hyperlipidemia  Reviewed the increase on recent labs. Encouraged to decrease intake of fatty food. Recheck in 6 months. Chief Complaint     Chief Complaint   Patient presents with   • Follow-up     6 weeks no concerns today        History of Present Illness   Ritchie David is a 37y.o.-year-old male who presents for follow-up on anxiety. 6 week follow-up on anxiety - restarted Lexapro 10 mg daily last visit - declined referral to therapist - since last visit, feels that he is feeling better and denies any side effects - notes he feels level and much less anxious             Review of Systems   Review of Systems   Constitutional: Negative for chills and fever. Respiratory: Negative for shortness of breath. Cardiovascular: Negative for chest pain, palpitations and leg swelling. Neurological: Positive for headaches (occasional - tension in the back of the head at work). Negative for dizziness.        Active Problem List     Patient Active Problem List   Diagnosis   • Essential (primary) hypertension   • Anxiety   • Hypokalemia   • Tobacco use   • Chronic pain of right ankle   • Frequent bowel movements   • Class 2 obesity with body mass index (BMI) of 35.0 to 35.9 in adult, unspecified obesity type, unspecified whether serious comorbidity present   • Impaired fasting glucose   • Frequent stools   • Hyperlipidemia         Past Medical History:  Past Medical History:   Diagnosis Date   • Abnormal LFTs    • Current severe episode of 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 without prior episode ([address]) 11/6/2018   • Rectal hemorrhage        Past Surgical History:  Past Surgical History:   Procedure Laterality Date   • SKIN SURGERY Right     cyst excised from under right eye - early 2000s   • VASECTOMY      Vas Deferens       Family History:  Family History   Problem Relation Age of Onset   • Hiatal hernia Mother    • Hypertension Mother    • Skin cancer Mother         melanoma   • Colon polyps Mother         Of the sigmoid colon   • Leukemia Mother    • Diabetes Father    • Hypertension Brother    • Colon cancer Maternal Grandmother    • Diabetes Maternal Uncle        Social History:  Social History     Socioeconomic History   • Marital status: /Civil Union     Spouse name: Not on file   • Number of children: Not on file   • Years of education: Not on file   • Highest education level: Not on file   Occupational History   • Not on file   Tobacco Use   • Smoking status: Every Day     Packs/day: 1.50     Types: Cigarettes     Start date: 1996   • Smokeless tobacco: Never   • Tobacco comments:     tobacco use - has cut from 2 ppd almost when was depressed - as of 7/2023, smoking about 1.5 ppd   Vaping Use   • Vaping Use: Never used   Substance and Sexual Activity   • Alcohol use: Yes     Comment: Being a social drinker - averages 5-6 drinks up to 3 times weekly   • Drug use: No   • Sexual activity: Not on file   Other Topics Concern   • Not on file   Social History Narrative   • Not on file     Social Determinants of Health     Financial Resource Strain: Not on file   Food Insecurity: Not on file   Transportation Needs: Not on file   Physical Activity: Not on file   Stress: Not on file   Social Connections: Not on file   Intimate Partner Violence: Not on file   Housing Stability: Not on file       Objective     Vitals:    09/15/23 0814   BP: 134/88   BP Location: Left arm   Patient Position: Sitting   Cuff Size: Large   Pulse: 97   Resp: 20   Temp: (!) 97 °F (36.1 °C)   TempSrc: Tympanic   SpO2: 97%   Weight: 103 kg (227 lb 9.6 oz) Height: 5' 6" (1.676 m)     Wt Readings from Last 3 Encounters:   09/15/23 103 kg (227 lb 9.6 oz)   07/28/23 101 kg (223 lb)   07/22/22 102 kg (224 lb)       Physical Exam  Vitals reviewed. Constitutional:       General: He is not in acute distress. Appearance: He is well-developed. HENT:      Head: Normocephalic and atraumatic. Neck:      Thyroid: No thyromegaly. Cardiovascular:      Rate and Rhythm: Normal rate and regular rhythm. Heart sounds: Normal heart sounds. No murmur heard. Comments: No carotid bruits noted  Pulmonary:      Effort: Pulmonary effort is normal.      Breath sounds: Normal breath sounds. No wheezing or rales. Musculoskeletal:      Cervical back: Neck supple. Lymphadenopathy:      Cervical: No cervical adenopathy. Neurological:      Mental Status: He is alert.          Pertinent Laboratory/Diagnostic Studies:  Lab Results   Component Value Date    GLUCOSE 101 11/11/2014    BUN 13 09/14/2023    CREATININE 0.89 09/14/2023    CALCIUM 9.5 09/14/2023     11/11/2014    K 3.7 09/14/2023    CO2 22 09/14/2023     09/14/2023     Lab Results   Component Value Date    ALT 43 09/14/2023    AST 21 09/14/2023    ALKPHOS 83 09/14/2023    BILITOT 1.73 (H) 11/11/2014       Lab Results   Component Value Date    WBC 7.92 09/14/2023    HGB 16.3 09/14/2023    HCT 48.0 09/14/2023    MCV 93 09/14/2023     09/14/2023     Lab Results   Component Value Date    CHOL 165 11/11/2014     Lab Results   Component Value Date    TRIG 164 (H) 09/14/2023     Lab Results   Component Value Date    HDL 47 09/14/2023     Lab Results   Component Value Date    LDLCALC 140 (H) 09/14/2023     Lab Results   Component Value Date    HGBA1C 5.2 09/14/2023       Results for orders placed or performed in visit on 09/14/23   CBC and differential   Result Value Ref Range    WBC 7.92 4.31 - 10.16 Thousand/uL    RBC 5.19 3.88 - 5.62 Million/uL    Hemoglobin 16.3 12.0 - 17.0 g/dL    Hematocrit 48.0 36.5 - 49.3 %    MCV 93 82 - 98 fL    MCH 31.4 26.8 - 34.3 pg    MCHC 34.0 31.4 - 37.4 g/dL    RDW 12.4 11.6 - 15.1 %    MPV 10.9 8.9 - 12.7 fL    Platelets 585 013 - 854 Thousands/uL    nRBC 0 /100 WBCs    Neutrophils Relative 52 43 - 75 %    Immat GRANS % 0 0 - 2 %    Lymphocytes Relative 38 14 - 44 %    Monocytes Relative 7 4 - 12 %    Eosinophils Relative 2 0 - 6 %    Basophils Relative 1 0 - 1 %    Neutrophils Absolute 4.20 1.85 - 7.62 Thousands/µL    Immature Grans Absolute 0.03 0.00 - 0.20 Thousand/uL    Lymphocytes Absolute 2.97 0.60 - 4.47 Thousands/µL    Monocytes Absolute 0.55 0.17 - 1.22 Thousand/µL    Eosinophils Absolute 0.13 0.00 - 0.61 Thousand/µL    Basophils Absolute 0.04 0.00 - 0.10 Thousands/µL   Comprehensive metabolic panel   Result Value Ref Range    Sodium 138 135 - 147 mmol/L    Potassium 3.7 3.5 - 5.3 mmol/L    Chloride 103 96 - 108 mmol/L    CO2 22 21 - 32 mmol/L    ANION GAP 13 mmol/L    BUN 13 5 - 25 mg/dL    Creatinine 0.89 0.60 - 1.30 mg/dL    Glucose, Fasting 122 (H) 65 - 99 mg/dL    Calcium 9.5 8.4 - 10.2 mg/dL    AST 21 13 - 39 U/L    ALT 43 7 - 52 U/L    Alkaline Phosphatase 83 34 - 104 U/L    Total Protein 6.9 6.4 - 8.4 g/dL    Albumin 4.5 3.5 - 5.0 g/dL    Total Bilirubin 1.34 (H) 0.20 - 1.00 mg/dL    eGFR 104 ml/min/1.73sq m   Hemoglobin A1C   Result Value Ref Range    Hemoglobin A1C 5.2 Normal 4.0-5.6%; PreDiabetic 5.7-6.4%;  Diabetic >=6.5%; Glycemic control for adults with diabetes <7.0% %     mg/dl   Lipid Panel with Direct LDL reflex   Result Value Ref Range    Cholesterol 220 (H) See Comment mg/dL    Triglycerides 164 (H) See Comment mg/dL    HDL, Direct 47 >=40 mg/dL    LDL Calculated 140 (H) 0 - 100 mg/dL   TSH, 3rd generation with Free T4 reflex   Result Value Ref Range    TSH 3RD GENERATON 0.825 0.450 - 4.500 uIU/mL       Orders Placed This Encounter   Procedures   • Comprehensive metabolic panel   • Hemoglobin A1C   • Lipid Panel with Direct LDL reflex ALLERGIES:  No Known Allergies    Current Medications     Current Outpatient Medications   Medication Sig Dispense Refill   • escitalopram (LEXAPRO) 10 mg tablet Take 1/2 tablet daily x 1 week and then increase to 1 tablet daily by mouth. 30 tablet 2   • lisinopril (ZESTRIL) 20 mg tablet Take 1 tablet (20 mg total) by mouth daily 30 tablet 5     No current facility-administered medications for this visit.          Health Maintenance     Health Maintenance   Topic Date Due   • Influenza Vaccine (1) 09/01/2023   • COVID-19 Vaccine (3 - Pfizer series) 10/28/2023 (Originally 10/18/2021)   • Depression Screening  07/28/2024   • BMI: Followup Plan  07/28/2024   • Annual Physical  07/28/2024   • BMI: Adult  09/15/2024   • DTaP,Tdap,and Td Vaccines (3 - Td or Tdap) 07/22/2032   • HIV Screening  Completed   • Hepatitis C Screening  Completed   • Pneumococcal Vaccine: Pediatrics (0 to 5 Years) and At-Risk Patients (6 to 59 Years)  Completed   • HIB Vaccine  Aged Out   • IPV Vaccine  Aged Out   • Hepatitis A Vaccine  Aged Out   • Meningococcal ACWY Vaccine  Aged Out   • HPV Vaccine  Aged [employer] History   Administered Date(s) Administered   • COVID-19 PFIZER VACCINE 0.3 ML IM 08/02/2021, 08/23/2021   • INFLUENZA 10/09/2019, 10/30/2020   • Influenza Quadrivalent Preservative Free 3 years and older IM 11/11/2014, 10/29/2018   • Pneumococcal Conjugate Vaccine 20-valent (Pcv20), Polysace 07/22/2022   • Tdap 08/03/2012, 07/22/2022       Isabela Rosario PA-C  9/15/2023 8:46 AM  [facility]

## 2023-09-15 NOTE — ASSESSMENT & PLAN NOTE
Reviewed the increase on recent labs. Encouraged to decrease intake of fatty food. Recheck in 6 months.

## 2023-11-10 DIAGNOSIS — F41.9 ANXIETY: ICD-10-CM

## 2023-11-10 RX ORDER — ESCITALOPRAM OXALATE 10 MG/1
TABLET ORAL
Qty: 30 TABLET | Refills: 2 | Status: SHIPPED | OUTPATIENT
Start: 2023-11-10

## 2024-01-20 DIAGNOSIS — F41.9 ANXIETY: ICD-10-CM

## 2024-01-22 RX ORDER — ESCITALOPRAM OXALATE 10 MG/1
TABLET ORAL
Qty: 90 TABLET | Refills: 1 | Status: SHIPPED | OUTPATIENT
Start: 2024-01-22

## 2024-02-01 DIAGNOSIS — I10 ESSENTIAL (PRIMARY) HYPERTENSION: ICD-10-CM

## 2024-02-01 RX ORDER — LISINOPRIL 20 MG/1
20 TABLET ORAL DAILY
Qty: 90 TABLET | Refills: 1 | Status: SHIPPED | OUTPATIENT
Start: 2024-02-01

## 2024-03-28 ENCOUNTER — APPOINTMENT (OUTPATIENT)
Dept: LAB | Facility: CLINIC | Age: 44
End: 2024-03-28
Payer: COMMERCIAL

## 2024-03-28 DIAGNOSIS — E78.5 HYPERLIPIDEMIA, UNSPECIFIED HYPERLIPIDEMIA TYPE: ICD-10-CM

## 2024-03-28 DIAGNOSIS — I10 ESSENTIAL (PRIMARY) HYPERTENSION: ICD-10-CM

## 2024-03-28 DIAGNOSIS — R73.01 IMPAIRED FASTING GLUCOSE: ICD-10-CM

## 2024-03-28 LAB
ALBUMIN SERPL BCP-MCNC: 4.4 G/DL (ref 3.5–5)
ALP SERPL-CCNC: 80 U/L (ref 34–104)
ALT SERPL W P-5'-P-CCNC: 51 U/L (ref 7–52)
ANION GAP SERPL CALCULATED.3IONS-SCNC: 11 MMOL/L (ref 4–13)
AST SERPL W P-5'-P-CCNC: 21 U/L (ref 13–39)
BILIRUB SERPL-MCNC: 1.01 MG/DL (ref 0.2–1)
BUN SERPL-MCNC: 13 MG/DL (ref 5–25)
CALCIUM SERPL-MCNC: 8.8 MG/DL (ref 8.4–10.2)
CHLORIDE SERPL-SCNC: 101 MMOL/L (ref 96–108)
CHOLEST SERPL-MCNC: 192 MG/DL
CO2 SERPL-SCNC: 26 MMOL/L (ref 21–32)
CREAT SERPL-MCNC: 0.93 MG/DL (ref 0.6–1.3)
EST. AVERAGE GLUCOSE BLD GHB EST-MCNC: 105 MG/DL
GFR SERPL CREATININE-BSD FRML MDRD: 100 ML/MIN/1.73SQ M
GLUCOSE P FAST SERPL-MCNC: 115 MG/DL (ref 65–99)
HBA1C MFR BLD: 5.3 %
HDLC SERPL-MCNC: 48 MG/DL
LDLC SERPL CALC-MCNC: 115 MG/DL (ref 0–100)
POTASSIUM SERPL-SCNC: 3.9 MMOL/L (ref 3.5–5.3)
PROT SERPL-MCNC: 6.8 G/DL (ref 6.4–8.4)
SODIUM SERPL-SCNC: 138 MMOL/L (ref 135–147)
TRIGL SERPL-MCNC: 144 MG/DL

## 2024-03-28 PROCEDURE — 80061 LIPID PANEL: CPT

## 2024-03-28 PROCEDURE — 36415 COLL VENOUS BLD VENIPUNCTURE: CPT

## 2024-03-28 PROCEDURE — 83036 HEMOGLOBIN GLYCOSYLATED A1C: CPT

## 2024-03-28 PROCEDURE — 80053 COMPREHEN METABOLIC PANEL: CPT

## 2024-03-29 ENCOUNTER — OFFICE VISIT (OUTPATIENT)
Dept: FAMILY MEDICINE CLINIC | Facility: CLINIC | Age: 44
End: 2024-03-29
Payer: COMMERCIAL

## 2024-03-29 VITALS
SYSTOLIC BLOOD PRESSURE: 120 MMHG | TEMPERATURE: 96.7 F | HEIGHT: 66 IN | OXYGEN SATURATION: 99 % | RESPIRATION RATE: 16 BRPM | WEIGHT: 238.4 LBS | HEART RATE: 98 BPM | DIASTOLIC BLOOD PRESSURE: 96 MMHG | BODY MASS INDEX: 38.31 KG/M2

## 2024-03-29 DIAGNOSIS — I10 ESSENTIAL (PRIMARY) HYPERTENSION: ICD-10-CM

## 2024-03-29 DIAGNOSIS — Z72.0 TOBACCO USE: ICD-10-CM

## 2024-03-29 DIAGNOSIS — F41.9 ANXIETY: ICD-10-CM

## 2024-03-29 DIAGNOSIS — E78.5 HYPERLIPIDEMIA, UNSPECIFIED HYPERLIPIDEMIA TYPE: ICD-10-CM

## 2024-03-29 DIAGNOSIS — R73.01 IMPAIRED FASTING GLUCOSE: Primary | ICD-10-CM

## 2024-03-29 PROCEDURE — 99214 OFFICE O/P EST MOD 30 MIN: CPT | Performed by: PHYSICIAN ASSISTANT

## 2024-03-29 RX ORDER — LISINOPRIL 20 MG/1
20 TABLET ORAL DAILY
Qty: 90 TABLET | Refills: 1 | Status: SHIPPED | OUTPATIENT
Start: 2024-03-29

## 2024-03-29 NOTE — PROGRESS NOTES
FAMILY PRACTICE OFFICE VISIT  [facility] - [facility]       NAME: Julio Ma  AGE: 43 y.o. SEX: male       : 1980        MRN: [id number]    DATE: 3/31/2024  TIME: 6:46 PM    Assessment and Plan     Problem List Items Addressed This Visit          Cardiovascular and Mediastinum    Essential (primary) hypertension     Uncontrolled but patient states that he has not taken his medication consistently and has missed several times this past week.  He will start working on taking his medication consistently daily.  Will then recheck his control.  Continue lisinopril 20 mg daily.         Relevant Medications    lisinopril (ZESTRIL) 20 mg tablet    Other Relevant Orders    Lipid Panel with Direct LDL reflex    Hemoglobin A1C    Comprehensive metabolic panel    CBC and differential    TSH, 3rd generation with Free T4 reflex       Endocrine    Impaired fasting glucose - Primary     Reviewed his normal A1c but elevated fasting glucose.  He was encouraged to work on decreasing intake of carbohydrates.  He was encouraged at least to avoid drinking soda as a first step.  Will continue to monitor.         Relevant Orders    Lipid Panel with Direct LDL reflex    Hemoglobin A1C    Comprehensive metabolic panel    CBC and differential    TSH, 3rd generation with Free T4 reflex       Behavioral Health    Anxiety     Controlled.  Continue Lexapro 10 mg daily.         Tobacco use     Patient reports this is variable.  He declined medication to assist with smoking cessation.  We discussed trying to gradually cut back his intake each week.  Will monitor.            Other    Hyperlipidemia     Not on a statin.  Patient's last labs done yesterday show improvement in LDL with a decrease from 140 down to 115.  He would like to work on improving diet to further lower his readings.         Relevant Orders    Lipid Panel with Direct LDL reflex    Hemoglobin A1C    Comprehensive  metabolic panel    CBC and differential    TSH, 3rd generation with Free T4 reflex       Essential (primary) hypertension  Uncontrolled but patient states that he has not taken his medication consistently and has missed several times this past week.  He will start working on taking his medication consistently daily.  Will then recheck his control.  Continue lisinopril 20 mg daily.    Impaired fasting glucose  Reviewed his normal A1c but elevated fasting glucose.  He was encouraged to work on decreasing intake of carbohydrates.  He was encouraged at least to avoid drinking soda as a first step.  Will continue to monitor.    Tobacco use  Patient reports this is variable.  He declined medication to assist with smoking cessation.  We discussed trying to gradually cut back his intake each week.  Will monitor.    Anxiety  Controlled.  Continue Lexapro 10 mg daily.    Hyperlipidemia  Not on a statin.  Patient's last labs done yesterday show improvement in LDL with a decrease from 140 down to 115.  He would like to work on improving diet to further lower his readings.    Class 2 obesity with body mass index (BMI) of 38.0 to 38.9 in adult  BMI Counseling: Body mass index is 38.48 kg/m². The BMI is above normal. Nutrition recommendations include decreasing overall calorie intake, decreasing soda and/or juice intake, and reducing intake of saturated fat and trans fat. Exercise recommendations include exercising 3-5 times per week.            Chief Complaint     Chief Complaint   Patient presents with    Follow-up       History of Present Illness   Julio Ma is a 43 y.o.-year-old male who presents for follow-up on chronic conditions.     IFG - last labs showed A1c of 5.3% yesterday (and glucose of 115) - has a lot of soda, pasta, and rice    Hypertension-on lisinopril 20 mg daily - not checking at home and denies sx of poor control - has not been taking it consistently - only took a few times in the last  week    Tobacco use - notes that it varies day by day     Anxiety - on Lexapro 10 mg daily - controlled mostly but has his days when     Hyperlipidemia-not on a statin-last LDL was 115 yesterday (down from 140 in September)          Review of Systems   Review of Systems   Constitutional:  Negative for chills and fever.   Respiratory:  Negative for shortness of breath.    Cardiovascular:  Negative for chest pain, palpitations and leg swelling.   Neurological:  Negative for dizziness and headaches.       Active Problem List     Patient Active Problem List   Diagnosis    Essential (primary) hypertension    Anxiety    Hypokalemia    Tobacco use    Chronic pain of right ankle    Frequent bowel movements    Class 2 obesity with body mass index (BMI) of 38.0 to 38.9 in adult    Impaired fasting glucose    Frequent stools    Hyperlipidemia         Past Medical History:  Past Medical History:   Diagnosis Date    Abnormal LFTs     Current severe episode of 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 without prior episode ([facility]) 11/6/2018    Rectal hemorrhage        Past Surgical History:  Past Surgical History:   Procedure Laterality Date    SKIN SURGERY Right     cyst excised from under right eye - early 2000s    VASECTOMY      Vas Deferens       Family History:  Family History   Problem Relation Age of Onset    Hiatal hernia Mother     Hypertension Mother     Skin cancer Mother         melanoma    Colon polyps Mother         Of the sigmoid colon    Leukemia Mother     Diabetes Father     Hypertension Brother     Colon cancer Maternal Grandmother     Diabetes Maternal Uncle        Social History:  Social History     Socioeconomic History    Marital status: /Civil Union     Spouse name: Not on file    Number of children: Not on file    Years of education: Not on file    Highest education level: Not on file   Occupational History    Not on file   Tobacco Use    Smoking status: Every Day     Current packs/day:  "1.50     Average packs/day: 1.5 packs/day for 28.2 years (42.4 ttl pk-yrs)     Types: Cigarettes     Start date: 1996    Smokeless tobacco: Never    Tobacco comments:     tobacco use - has cut from 2 ppd almost when was depressed - as of 7/2023, smoking about 1.5 ppd   Vaping Use    Vaping status: Never Used   Substance and Sexual Activity    Alcohol use: Yes     Comment: Being a social drinker - averages 5-6 drinks up to 3 times weekly    Drug use: No    Sexual activity: Not on file   Other Topics Concern    Not on file   Social History Narrative    Not on file     Social Determinants of Health     Financial Resource Strain: Not on file   Food Insecurity: Not on file   Transportation Needs: Not on file   Physical Activity: Not on file   Stress: Not on file   Social Connections: Not on file   Intimate Partner Violence: Not on file   Housing Stability: Not on file       Objective     Vitals:    03/29/24 0800 03/29/24 0922   BP: 130/98 120/96   BP Location: Left arm    Cuff Size: Large    Pulse: 98    Resp: 16    Temp: (!) 96.7 °F (35.9 °C)    TempSrc: Tympanic    SpO2: 99%    Weight: 108 kg (238 lb 6.4 oz)    Height: 5' 6\" (1.676 m)      Wt Readings from Last 3 Encounters:   03/29/24 108 kg (238 lb 6.4 oz)   09/15/23 103 kg (227 lb 9.6 oz)   07/28/23 101 kg (223 lb)       Physical Exam  Vitals reviewed.   Constitutional:       General: He is not in acute distress.     Appearance: Normal appearance. He is well-developed. He is not ill-appearing.   HENT:      Head: Normocephalic and atraumatic.   Neck:      Thyroid: No thyromegaly.      Vascular: No carotid bruit.   Cardiovascular:      Rate and Rhythm: Normal rate and regular rhythm.      Pulses: Normal pulses.      Heart sounds: Normal heart sounds. No murmur heard.  Pulmonary:      Effort: Pulmonary effort is normal.      Breath sounds: Normal breath sounds. No wheezing, rhonchi or rales.   Musculoskeletal:      Cervical back: Neck supple.      Right lower leg: No " edema.      Left lower leg: No edema.   Lymphadenopathy:      Cervical: No cervical adenopathy.   Skin:     General: Skin is warm and dry.   Neurological:      Mental Status: He is alert.   Psychiatric:         Mood and Affect: Mood normal.         Behavior: Behavior normal.         Thought Content: Thought content normal.         Judgment: Judgment normal.         Pertinent Laboratory/Diagnostic Studies:  Lab Results   Component Value Date    GLUCOSE 101 11/11/2014    BUN 13 03/28/2024    CREATININE 0.93 03/28/2024    CALCIUM 8.8 03/28/2024     11/11/2014    K 3.9 03/28/2024    CO2 26 03/28/2024     03/28/2024     Lab Results   Component Value Date    ALT 51 03/28/2024    AST 21 03/28/2024    ALKPHOS 80 03/28/2024    BILITOT 1.73 (H) 11/11/2014       Lab Results   Component Value Date    WBC 7.92 09/14/2023    HGB 16.3 09/14/2023    HCT 48.0 09/14/2023    MCV 93 09/14/2023     09/14/2023     Lab Results   Component Value Date    CHOL 165 11/11/2014     Lab Results   Component Value Date    TRIG 144 03/28/2024     Lab Results   Component Value Date    HDL 48 03/28/2024     Lab Results   Component Value Date    LDLCALC 115 (H) 03/28/2024     Lab Results   Component Value Date    HGBA1C 5.3 03/28/2024       Results for orders placed or performed in visit on 03/28/24   Comprehensive metabolic panel   Result Value Ref Range    Sodium 138 135 - 147 mmol/L    Potassium 3.9 3.5 - 5.3 mmol/L    Chloride 101 96 - 108 mmol/L    CO2 26 21 - 32 mmol/L    ANION GAP 11 4 - 13 mmol/L    BUN 13 5 - 25 mg/dL    Creatinine 0.93 0.60 - 1.30 mg/dL    Glucose, Fasting 115 (H) 65 - 99 mg/dL    Calcium 8.8 8.4 - 10.2 mg/dL    AST 21 13 - 39 U/L    ALT 51 7 - 52 U/L    Alkaline Phosphatase 80 34 - 104 U/L    Total Protein 6.8 6.4 - 8.4 g/dL    Albumin 4.4 3.5 - 5.0 g/dL    Total Bilirubin 1.01 (H) 0.20 - 1.00 mg/dL    eGFR 100 ml/min/1.73sq m   Hemoglobin A1C   Result Value Ref Range    Hemoglobin A1C 5.3 Normal  4.0-5.6%; PreDiabetic 5.7-6.4%; Diabetic >=6.5%; Glycemic control for adults with diabetes <7.0% %     mg/dl   Lipid Panel with Direct LDL reflex   Result Value Ref Range    Cholesterol 192 See Comment mg/dL    Triglycerides 144 See Comment mg/dL    HDL, Direct 48 >=40 mg/dL    LDL Calculated 115 (H) 0 - 100 mg/dL           ALLERGIES:  No Known Allergies    Current Medications     Current Outpatient Medications   Medication Sig Dispense Refill    escitalopram (LEXAPRO) 10 mg tablet TAKE HALF A TABLET (5 MG) BY MOUTH DAILY X 1 WEEK AND THEN INCREASE TO 1 TABLET DAILY 90 tablet 1    lisinopril (ZESTRIL) 20 mg tablet Take 1 tablet (20 mg total) by mouth daily 90 tablet 1     No current facility-administered medications for this visit.         Health Maintenance     Health Maintenance   Topic Date Due    Influenza Vaccine (1) 09/01/2023    COVID-19 Vaccine (3 - 2023-24 season) 09/01/2023    Annual Physical  07/28/2024    Depression Screening  03/29/2025    Zoster Vaccine (1 of 2) 04/09/2030    DTaP,Tdap,and Td Vaccines (3 - Td or Tdap) 07/22/2032    HIV Screening  Completed    Hepatitis C Screening  Completed    Pneumococcal Vaccine: Pediatrics (0 to 5 Years) and At-Risk Patients (6 to 64 Years)  Completed    HIB Vaccine  Aged Out    IPV Vaccine  Aged Out    Hepatitis A Vaccine  Aged Out    Meningococcal ACWY Vaccine  Aged Out    HPV Vaccine  Aged Out     Immunization History   Administered Date(s) Administered    COVID-19 PFIZER VACCINE 0.3 ML IM 08/02/2021, 08/23/2021    INFLUENZA 10/09/2019, 10/30/2020    Influenza Quadrivalent Preservative Free 3 years and older IM 11/11/2014, 10/29/2018    Pneumococcal Conjugate Vaccine 20-valent (Pcv20), Polysace 07/22/2022    Tdap 08/03/2012, 07/22/2022       Farzana Rascon PA-C  3/31/2024 6:46 PM  [facility]

## 2024-03-31 NOTE — ASSESSMENT & PLAN NOTE
Uncontrolled but patient states that he has not taken his medication consistently and has missed several times this past week.  He will start working on taking his medication consistently daily.  Will then recheck his control.  Continue lisinopril 20 mg daily.

## 2024-03-31 NOTE — ASSESSMENT & PLAN NOTE
BMI Counseling: Body mass index is 38.48 kg/m². The BMI is above normal. Nutrition recommendations include decreasing overall calorie intake, decreasing soda and/or juice intake, and reducing intake of saturated fat and trans fat. Exercise recommendations include exercising 3-5 times per week.

## 2024-03-31 NOTE — ASSESSMENT & PLAN NOTE
Patient reports this is variable.  He declined medication to assist with smoking cessation.  We discussed trying to gradually cut back his intake each week.  Will monitor.

## 2024-03-31 NOTE — ASSESSMENT & PLAN NOTE
Reviewed his normal A1c but elevated fasting glucose.  He was encouraged to work on decreasing intake of carbohydrates.  He was encouraged at least to avoid drinking soda as a first step.  Will continue to monitor.

## 2024-03-31 NOTE — ASSESSMENT & PLAN NOTE
Not on a statin.  Patient's last labs done yesterday show improvement in LDL with a decrease from 140 down to 115.  He would like to work on improving diet to further lower his readings.

## 2024-08-23 DIAGNOSIS — F41.9 ANXIETY: ICD-10-CM

## 2024-08-23 RX ORDER — ESCITALOPRAM OXALATE 10 MG/1
TABLET ORAL
Qty: 30 TABLET | Refills: 5 | Status: SHIPPED | OUTPATIENT
Start: 2024-08-23

## 2024-11-01 DIAGNOSIS — I10 ESSENTIAL (PRIMARY) HYPERTENSION: ICD-10-CM

## 2024-11-01 RX ORDER — LISINOPRIL 20 MG/1
20 TABLET ORAL DAILY
Qty: 30 TABLET | Refills: 2 | Status: SHIPPED | OUTPATIENT
Start: 2024-11-01

## 2025-01-15 ENCOUNTER — APPOINTMENT (OUTPATIENT)
Dept: LAB | Facility: CLINIC | Age: 45
End: 2025-01-15
Payer: COMMERCIAL

## 2025-01-15 DIAGNOSIS — I10 ESSENTIAL (PRIMARY) HYPERTENSION: ICD-10-CM

## 2025-01-15 DIAGNOSIS — R73.01 IMPAIRED FASTING GLUCOSE: ICD-10-CM

## 2025-01-15 DIAGNOSIS — E78.5 HYPERLIPIDEMIA, UNSPECIFIED HYPERLIPIDEMIA TYPE: ICD-10-CM

## 2025-01-15 LAB
ALBUMIN SERPL BCG-MCNC: 4.8 G/DL (ref 3.5–5)
ALP SERPL-CCNC: 86 U/L (ref 34–104)
ALT SERPL W P-5'-P-CCNC: 36 U/L (ref 7–52)
ANION GAP SERPL CALCULATED.3IONS-SCNC: 8 MMOL/L (ref 4–13)
AST SERPL W P-5'-P-CCNC: 18 U/L (ref 13–39)
BASOPHILS # BLD AUTO: 0.05 THOUSANDS/ΜL (ref 0–0.1)
BASOPHILS NFR BLD AUTO: 1 % (ref 0–1)
BILIRUB SERPL-MCNC: 1.08 MG/DL (ref 0.2–1)
BUN SERPL-MCNC: 13 MG/DL (ref 5–25)
CALCIUM SERPL-MCNC: 9.6 MG/DL (ref 8.4–10.2)
CHLORIDE SERPL-SCNC: 103 MMOL/L (ref 96–108)
CHOLEST SERPL-MCNC: 203 MG/DL (ref ?–200)
CO2 SERPL-SCNC: 29 MMOL/L (ref 21–32)
CREAT SERPL-MCNC: 0.94 MG/DL (ref 0.6–1.3)
EOSINOPHIL # BLD AUTO: 0.12 THOUSAND/ΜL (ref 0–0.61)
EOSINOPHIL NFR BLD AUTO: 1 % (ref 0–6)
ERYTHROCYTE [DISTWIDTH] IN BLOOD BY AUTOMATED COUNT: 12.2 % (ref 11.6–15.1)
EST. AVERAGE GLUCOSE BLD GHB EST-MCNC: 108 MG/DL
GFR SERPL CREATININE-BSD FRML MDRD: 98 ML/MIN/1.73SQ M
GLUCOSE P FAST SERPL-MCNC: 106 MG/DL (ref 65–99)
HBA1C MFR BLD: 5.4 %
HCT VFR BLD AUTO: 52.2 % (ref 36.5–49.3)
HDLC SERPL-MCNC: 45 MG/DL
HGB BLD-MCNC: 17.4 G/DL (ref 12–17)
IMM GRANULOCYTES # BLD AUTO: 0.03 THOUSAND/UL (ref 0–0.2)
IMM GRANULOCYTES NFR BLD AUTO: 0 % (ref 0–2)
LDLC SERPL CALC-MCNC: 123 MG/DL (ref 0–100)
LYMPHOCYTES # BLD AUTO: 3.88 THOUSANDS/ΜL (ref 0.6–4.47)
LYMPHOCYTES NFR BLD AUTO: 40 % (ref 14–44)
MCH RBC QN AUTO: 31.2 PG (ref 26.8–34.3)
MCHC RBC AUTO-ENTMCNC: 33.3 G/DL (ref 31.4–37.4)
MCV RBC AUTO: 94 FL (ref 82–98)
MONOCYTES # BLD AUTO: 0.64 THOUSAND/ΜL (ref 0.17–1.22)
MONOCYTES NFR BLD AUTO: 7 % (ref 4–12)
NEUTROPHILS # BLD AUTO: 5.07 THOUSANDS/ΜL (ref 1.85–7.62)
NEUTS SEG NFR BLD AUTO: 51 % (ref 43–75)
NRBC BLD AUTO-RTO: 0 /100 WBCS
PLATELET # BLD AUTO: 259 THOUSANDS/UL (ref 149–390)
PMV BLD AUTO: 10.6 FL (ref 8.9–12.7)
POTASSIUM SERPL-SCNC: 3.9 MMOL/L (ref 3.5–5.3)
PROT SERPL-MCNC: 7.5 G/DL (ref 6.4–8.4)
RBC # BLD AUTO: 5.58 MILLION/UL (ref 3.88–5.62)
SODIUM SERPL-SCNC: 140 MMOL/L (ref 135–147)
TRIGL SERPL-MCNC: 176 MG/DL (ref ?–150)
TSH SERPL DL<=0.05 MIU/L-ACNC: 1.44 UIU/ML (ref 0.45–4.5)
WBC # BLD AUTO: 9.79 THOUSAND/UL (ref 4.31–10.16)

## 2025-01-15 PROCEDURE — 36415 COLL VENOUS BLD VENIPUNCTURE: CPT

## 2025-01-15 PROCEDURE — 80061 LIPID PANEL: CPT

## 2025-01-15 PROCEDURE — 85025 COMPLETE CBC W/AUTO DIFF WBC: CPT

## 2025-01-15 PROCEDURE — 84443 ASSAY THYROID STIM HORMONE: CPT

## 2025-01-15 PROCEDURE — 83036 HEMOGLOBIN GLYCOSYLATED A1C: CPT

## 2025-01-15 PROCEDURE — 80053 COMPREHEN METABOLIC PANEL: CPT

## 2025-01-16 ENCOUNTER — RESULTS FOLLOW-UP (OUTPATIENT)
Dept: FAMILY MEDICINE CLINIC | Facility: CLINIC | Age: 45
End: 2025-01-16

## 2025-01-17 ENCOUNTER — OFFICE VISIT (OUTPATIENT)
Dept: FAMILY MEDICINE CLINIC | Facility: CLINIC | Age: 45
End: 2025-01-17
Payer: COMMERCIAL

## 2025-01-17 VITALS
HEART RATE: 93 BPM | TEMPERATURE: 97 F | HEIGHT: 66 IN | WEIGHT: 230 LBS | BODY MASS INDEX: 36.96 KG/M2 | DIASTOLIC BLOOD PRESSURE: 88 MMHG | SYSTOLIC BLOOD PRESSURE: 134 MMHG

## 2025-01-17 DIAGNOSIS — Z80.0 FAMILY HISTORY OF COLON CANCER: ICD-10-CM

## 2025-01-17 DIAGNOSIS — R73.01 IMPAIRED FASTING GLUCOSE: ICD-10-CM

## 2025-01-17 DIAGNOSIS — Z00.00 ANNUAL PHYSICAL EXAM: Primary | ICD-10-CM

## 2025-01-17 DIAGNOSIS — E66.812 CLASS 2 OBESITY WITH BODY MASS INDEX (BMI) OF 37.0 TO 37.9 IN ADULT, UNSPECIFIED OBESITY TYPE, UNSPECIFIED WHETHER SERIOUS COMORBIDITY PRESENT: ICD-10-CM

## 2025-01-17 DIAGNOSIS — Z72.0 TOBACCO USE: ICD-10-CM

## 2025-01-17 DIAGNOSIS — I10 ESSENTIAL (PRIMARY) HYPERTENSION: ICD-10-CM

## 2025-01-17 DIAGNOSIS — E78.5 HYPERLIPIDEMIA, UNSPECIFIED HYPERLIPIDEMIA TYPE: ICD-10-CM

## 2025-01-17 DIAGNOSIS — F41.9 ANXIETY: ICD-10-CM

## 2025-01-17 PROCEDURE — 99396 PREV VISIT EST AGE 40-64: CPT | Performed by: PHYSICIAN ASSISTANT

## 2025-01-17 PROCEDURE — 99214 OFFICE O/P EST MOD 30 MIN: CPT | Performed by: PHYSICIAN ASSISTANT

## 2025-01-17 RX ORDER — ATORVASTATIN CALCIUM 20 MG/1
20 TABLET, FILM COATED ORAL DAILY
Qty: 30 TABLET | Refills: 5 | Status: SHIPPED | OUTPATIENT
Start: 2025-01-17

## 2025-01-17 NOTE — ASSESSMENT & PLAN NOTE
BMI Counseling: Body mass index is 37.12 kg/m². The BMI is above normal. Nutrition recommendations include decreasing overall calorie intake and 3-5 servings of fruits/vegetables daily. Exercise recommendations include exercising 3-5 times per week.

## 2025-01-17 NOTE — PROGRESS NOTES
Adult Annual Physical  Name: Julio Ma      : 1980      MRN: [id number]  Encounter Provider: Farzana Rascon PA-C  Encounter Date: 2025   Encounter department: [facility]    Assessment & Plan  Annual physical exam         Essential (primary) hypertension  Acceptable/borderline today. Continue lisinopril 20 mg daily.        Anxiety  Controlled. Continue Lexapro 10 mg daily.        Class 2 obesity with body mass index (BMI) of 37.0 to 37.9 in adult, unspecified obesity type, unspecified whether serious comorbidity present  BMI Counseling: Body mass index is 37.12 kg/m². The BMI is above normal. Nutrition recommendations include decreasing overall calorie intake and 3-5 servings of fruits/vegetables daily. Exercise recommendations include exercising 3-5 times per week.           Hyperlipidemia, unspecified hyperlipidemia type  Reviewed recent results as well as high calculated ASCVD risk. Patient agreeable to try Lipitor 20 mg daily. Recheck lipid panel in about 3 months. Call with any concerns in the interim.   Orders:    atorvastatin (LIPITOR) 20 mg tablet; Take 1 tablet (20 mg total) by mouth daily    Comprehensive metabolic panel; Future    Lipid Panel with Direct LDL reflex; Future    Family history of colon cancer    Orders:    Ambulatory referral to Colorectal Surgery; Future    Tobacco use  Tobacco Cessation Counseling: Tobacco cessation counseling and education was provided. The patient is sincerely urged to quit consumption of tobacco. He is not ready to quit tobacco. The numerous health risks of tobacco consumption were discussed. If he decides to quit, there are a number of helpful adjunctive aids, and he can see me to discuss nicotine replacement therapy, chantix, or bupropion anytime in the future.. I spent 5 minutes on Tobacco Cessation counseling during today's visit.          Impaired fasting glucose  Reviewed that his recent A1c was normal. Patient was  encouraged to limit carb intake. Will continue to monitor.       Immunizations and preventive care screenings were discussed with patient today. Appropriate education was printed on patient's after visit summary.        Counseling:  Exercise: the importance of regular exercise/physical activity was discussed. Recommend exercise 3-5 times per week for at least 30 minutes.          History of Present Illness     Adult Annual Physical:  Patient presents for annual physical.     IFG - last labs showed A1c of 5.4% this week (and glucose of 106)      Hypertension - on lisinopril 20 mg daily - not checking at home - has occ stress headaches      Tobacco use - avg about 1 ppd (less on weekends) - not ready to quit     Anxiety - on Lexapro 10 mg daily - controlled      Hyperlipidemia - not on a statin - last LDL was 123 this week - calculated ASCVD risk is 12.3%  .     Diet and Physical Activity:  - Diet/Nutrition: poor diet. 1 fruit/veggie daily  - Exercise: walking. all day at work    Depression Screening:  - PHQ-2 Score: 0  - PHQ-9 Score: 0    General Health:  - Sleep: sleeps well and 7-8 hours of sleep on average.  - Hearing: normal hearing bilateral ears.  - Vision: vision problems and most recent eye exam > 1 year ago. last visit was about 20 years ago  - Dental: regular dental visits.     Health:    - Urinary symptoms: none.     Advanced Care Planning:  - Has an advanced directive?: no    - Has a durable medical POA?: no    - ACP document given to patient?: yes      Review of Systems   Constitutional:  Negative for chills and fever.   HENT:  Negative for congestion, rhinorrhea and sore throat.    Eyes:  Negative for visual disturbance.   Respiratory:  Negative for cough, shortness of breath and wheezing.    Cardiovascular:  Negative for chest pain, palpitations and leg swelling.   Gastrointestinal:  Negative for abdominal pain, constipation, diarrhea, nausea and vomiting.   Genitourinary:  Positive for frequency.  "Negative for dysuria.   Musculoskeletal:  Negative for arthralgias and myalgias.   Skin:  Negative for rash.   Neurological:  Positive for headaches (stress HAs). Negative for dizziness and syncope.   Hematological:  Does not bruise/bleed easily.   Psychiatric/Behavioral:  Negative for dysphoric mood. The patient is not nervous/anxious.      Medical History Reviewed by provider this encounter:  Tobacco  Surg Hx  Fam Hx  Soc Hx    .    Objective   /88   Pulse 93   Temp (!) 97 °F (36.1 °C)   Ht 5' 6\" (1.676 m)   Wt 104 kg (230 lb)   BMI 37.12 kg/m²     Physical Exam  Vitals reviewed.   Constitutional:       General: He is not in acute distress.     Appearance: Normal appearance. He is well-developed. He is obese. He is not ill-appearing.   HENT:      Head: Normocephalic and atraumatic.      Right Ear: Tympanic membrane, ear canal and external ear normal.      Left Ear: Tympanic membrane, ear canal and external ear normal.      Mouth/Throat:      Mouth: Mucous membranes are moist.      Pharynx: No oropharyngeal exudate or posterior oropharyngeal erythema.   Eyes:      Conjunctiva/sclera: Conjunctivae normal.      Pupils: Pupils are equal, round, and reactive to light.   Neck:      Thyroid: No thyromegaly.      Vascular: No carotid bruit.   Cardiovascular:      Rate and Rhythm: Normal rate and regular rhythm.      Pulses: Normal pulses.      Heart sounds: Normal heart sounds. No murmur heard.  Pulmonary:      Effort: Pulmonary effort is normal.      Breath sounds: Normal breath sounds. No wheezing, rhonchi or rales.   Abdominal:      General: Bowel sounds are normal. There is no distension.      Palpations: Abdomen is soft. There is no mass.      Tenderness: There is no abdominal tenderness. There is no guarding.   Musculoskeletal:      Cervical back: Normal range of motion and neck supple.      Right lower leg: No edema.      Left lower leg: No edema.   Lymphadenopathy:      Cervical: No cervical " adenopathy.   Skin:     General: Skin is warm and dry.      Findings: No rash.   Neurological:      Mental Status: He is alert.      Sensory: No sensory deficit.      Motor: No weakness.   Psychiatric:         Mood and Affect: Mood normal.         Behavior: Behavior normal.         Thought Content: Thought content normal.         Judgment: Judgment normal.

## 2025-01-17 NOTE — ASSESSMENT & PLAN NOTE
Reviewed recent results as well as high calculated ASCVD risk. Patient agreeable to try Lipitor 20 mg daily. Recheck lipid panel in about 3 months. Call with any concerns in the interim.   Orders:    atorvastatin (LIPITOR) 20 mg tablet; Take 1 tablet (20 mg total) by mouth daily    Comprehensive metabolic panel; Future    Lipid Panel with Direct LDL reflex; Future

## 2025-01-17 NOTE — PATIENT INSTRUCTIONS
"Patient Education     Routine physical for adults   The Basics   Written by the doctors and editors at [facility]   What is a physical? -- A physical is a routine visit, or \"check-up,\" with your doctor. You might also hear it called a \"wellness visit\" or \"preventive visit.\"  During each visit, the doctor will:   Ask about your physical and mental health   Ask about your habits, behaviors, and lifestyle   Do an exam   Give you vaccines if needed   Talk to you about any medicines you take   Give advice about your health   Answer your questions  Getting regular check-ups is an important part of taking care of your health. It can help your doctor find and treat any problems you have. But it's also important for preventing health problems.  A routine physical is different from a \"sick visit.\" A sick visit is when you see a doctor because of a health concern or problem. Since physicals are scheduled ahead of time, you can think about what you want to ask the doctor.  How often should I get a physical? -- It depends on your age and health. In general, for people age 21 years and older:   If you are younger than 50 years, you might be able to get a physical every 3 years.   If you are 50 years or older, your doctor might recommend a physical every year.  If you have an ongoing health condition, like diabetes or high blood pressure, your doctor will probably want to see you more often.  What happens during a physical? -- In general, each visit will include:   Physical exam - The doctor or nurse will check your height, weight, heart rate, and blood pressure. They will also look at your eyes and ears. They will ask about how you are feeling and whether you have any symptoms that bother you.   Medicines - It's a good idea to bring a list of all the medicines you take to each doctor visit. Your doctor will talk to you about your medicines and answer any questions. Tell them if you are having any side effects that bother you. You " "should also tell them if you are having trouble paying for any of your medicines.   Habits and behaviors - This includes:   Your diet   Your exercise habits   Whether you smoke, drink alcohol, or use drugs   Whether you are sexually active   Whether you feel safe at home  Your doctor will talk to you about things you can do to improve your health and lower your risk of health problems. They will also offer help and support. For example, if you want to quit smoking, they can give you advice and might prescribe medicines. If you want to improve your diet or get more physical activity, they can help you with this, too.   Lab tests, if needed - The tests you get will depend on your age and situation. For example, your doctor might want to check your:   Cholesterol   Blood sugar   Iron level   Vaccines - The recommended vaccines will depend on your age, health, and what vaccines you already had. Vaccines are very important because they can prevent certain serious or deadly infections.   Discussion of screening - \"Screening\" means checking for diseases or other health problems before they cause symptoms. Your doctor can recommend screening based on your age, risk, and preferences. This might include tests to check for:   Cancer, such as breast, prostate, cervical, ovarian, colorectal, prostate, lung, or skin cancer   Sexually transmitted infections, such as chlamydia and gonorrhea   Mental health conditions like depression and anxiety  Your doctor will talk to you about the different types of screening tests. They can help you decide which screenings to have. They can also explain what the results might mean.   Answering questions - The physical is a good time to ask the doctor or nurse questions about your health. If needed, they can refer you to other doctors or specialists, too.  Adults older than 65 years often need other care, too. As you get older, your doctor will talk to you about:   How to prevent falling at " home   Hearing or vision tests   Memory testing   How to take your medicines safely   Making sure that you have the help and support you need at home  All topics are updated as new evidence becomes available and our peer review process is complete.  This topic retrieved from [employer] on: May 02, 2024.  Topic 069060 Version 1.0  Release: 32.4.3 - C32.122  © 2024 UpToDate, Inc. and/or its affiliates. All rights reserved.  Consumer Information Use and Disclaimer   Disclaimer: This generalized information is a limited summary of diagnosis, treatment, and/or medication information. It is not meant to be comprehensive and should be used as a tool to help the user understand and/or assess potential diagnostic and treatment options. It does NOT include all information about conditions, treatments, medications, side effects, or risks that may apply to a specific patient. It is not intended to be medical advice or a substitute for the medical advice, diagnosis, or treatment of a health care provider based on the health care provider's examination and assessment of a patient's specific and unique circumstances. Patients must speak with a health care provider for complete information about their health, medical questions, and treatment options, including any risks or benefits regarding use of medications. This information does not endorse any treatments or medications as safe, effective, or approved for treating a specific patient. UpToDate, Inc. and its affiliates disclaim any warranty or liability relating to this information or the use thereof.The use of this information is governed by the Terms of Use, available at https://www.woltersB [employer] Media Entertainment Groupuwer.com/en/know/clinical-effectiveness-terms. 2024© UpToDate, Inc. and its affiliates and/or licensors. All rights reserved.  Copyright   © 2024 UpToDate, Inc. and/or its affiliates. All rights reserved.    Patient Education     Routine physical for adults   The Basics   Written by the  "doctors and editors at [facility]   What is a physical? -- A physical is a routine visit, or \"check-up,\" with your doctor. You might also hear it called a \"wellness visit\" or \"preventive visit.\"  During each visit, the doctor will:   Ask about your physical and mental health   Ask about your habits, behaviors, and lifestyle   Do an exam   Give you vaccines if needed   Talk to you about any medicines you take   Give advice about your health   Answer your questions  Getting regular check-ups is an important part of taking care of your health. It can help your doctor find and treat any problems you have. But it's also important for preventing health problems.  A routine physical is different from a \"sick visit.\" A sick visit is when you see a doctor because of a health concern or problem. Since physicals are scheduled ahead of time, you can think about what you want to ask the doctor.  How often should I get a physical? -- It depends on your age and health. In general, for people age 21 years and older:   If you are younger than 50 years, you might be able to get a physical every 3 years.   If you are 50 years or older, your doctor might recommend a physical every year.  If you have an ongoing health condition, like diabetes or high blood pressure, your doctor will probably want to see you more often.  What happens during a physical? -- In general, each visit will include:   Physical exam - The doctor or nurse will check your height, weight, heart rate, and blood pressure. They will also look at your eyes and ears. They will ask about how you are feeling and whether you have any symptoms that bother you.   Medicines - It's a good idea to bring a list of all the medicines you take to each doctor visit. Your doctor will talk to you about your medicines and answer any questions. Tell them if you are having any side effects that bother you. You should also tell them if you are having trouble paying for any of your " "medicines.   Habits and behaviors - This includes:   Your diet   Your exercise habits   Whether you smoke, drink alcohol, or use drugs   Whether you are sexually active   Whether you feel safe at home  Your doctor will talk to you about things you can do to improve your health and lower your risk of health problems. They will also offer help and support. For example, if you want to quit smoking, they can give you advice and might prescribe medicines. If you want to improve your diet or get more physical activity, they can help you with this, too.   Lab tests, if needed - The tests you get will depend on your age and situation. For example, your doctor might want to check your:   Cholesterol   Blood sugar   Iron level   Vaccines - The recommended vaccines will depend on your age, health, and what vaccines you already had. Vaccines are very important because they can prevent certain serious or deadly infections.   Discussion of screening - \"Screening\" means checking for diseases or other health problems before they cause symptoms. Your doctor can recommend screening based on your age, risk, and preferences. This might include tests to check for:   Cancer, such as breast, prostate, cervical, ovarian, colorectal, prostate, lung, or skin cancer   Sexually transmitted infections, such as chlamydia and gonorrhea   Mental health conditions like depression and anxiety  Your doctor will talk to you about the different types of screening tests. They can help you decide which screenings to have. They can also explain what the results might mean.   Answering questions - The physical is a good time to ask the doctor or nurse questions about your health. If needed, they can refer you to other doctors or specialists, too.  Adults older than 65 years often need other care, too. As you get older, your doctor will talk to you about:   How to prevent falling at home   Hearing or vision tests   Memory testing   How to take your " medicines safely   Making sure that you have the help and support you need at home  All topics are updated as new evidence becomes available and our peer review process is complete.  This topic retrieved from [employer] on: May 02, 2024.  Topic 867016 Version 1.0  Release: 32.4.3 - C32.122  © 2024 UpToDate, Inc. and/or its affiliates. All rights reserved.  Consumer Information Use and Disclaimer   Disclaimer: This generalized information is a limited summary of diagnosis, treatment, and/or medication information. It is not meant to be comprehensive and should be used as a tool to help the user understand and/or assess potential diagnostic and treatment options. It does NOT include all information about conditions, treatments, medications, side effects, or risks that may apply to a specific patient. It is not intended to be medical advice or a substitute for the medical advice, diagnosis, or treatment of a health care provider based on the health care provider's examination and assessment of a patient's specific and unique circumstances. Patients must speak with a health care provider for complete information about their health, medical questions, and treatment options, including any risks or benefits regarding use of medications. This information does not endorse any treatments or medications as safe, effective, or approved for treating a specific patient. UpToDate, Inc. and its affiliates disclaim any warranty or liability relating to this information or the use thereof.The use of this information is governed by the Terms of Use, available at https://www.woltersSta[employer].comuwer.com/en/know/clinical-effectiveness-terms. 2024© UpToDate, Inc. and its affiliates and/or licensors. All rights reserved.  Copyright   © 2024 UpToDate, Inc. and/or its affiliates. All rights reserved.

## 2025-01-19 NOTE — ASSESSMENT & PLAN NOTE
Tobacco Cessation Counseling: Tobacco cessation counseling and education was provided. The patient is sincerely urged to quit consumption of tobacco. He is not ready to quit tobacco. The numerous health risks of tobacco consumption were discussed. If he decides to quit, there are a number of helpful adjunctive aids, and he can see me to discuss nicotine replacement therapy, chantix, or bupropion anytime in the future.. I spent 5 minutes on Tobacco Cessation counseling during today's visit.

## 2025-01-19 NOTE — ASSESSMENT & PLAN NOTE
Reviewed that his recent A1c was normal. Patient was encouraged to limit carb intake. Will continue to monitor.

## 2025-02-06 ENCOUNTER — TELEPHONE (OUTPATIENT)
Dept: FAMILY MEDICINE CLINIC | Facility: CLINIC | Age: 45
End: 2025-02-06

## 2025-02-06 NOTE — TELEPHONE ENCOUNTER
ARASELIM for pt to call back to reschedule his July 18, 2025 9:00 AM appt with Farzana due to her being on vacation.

## 2025-02-14 DIAGNOSIS — E78.5 HYPERLIPIDEMIA, UNSPECIFIED HYPERLIPIDEMIA TYPE: ICD-10-CM

## 2025-02-14 RX ORDER — ATORVASTATIN CALCIUM 20 MG/1
20 TABLET, FILM COATED ORAL DAILY
Qty: 90 TABLET | Refills: 1 | Status: SHIPPED | OUTPATIENT
Start: 2025-02-14

## 2025-02-18 DIAGNOSIS — I10 ESSENTIAL (PRIMARY) HYPERTENSION: ICD-10-CM

## 2025-02-19 RX ORDER — LISINOPRIL 20 MG/1
20 TABLET ORAL DAILY
Qty: 30 TABLET | Refills: 5 | Status: SHIPPED | OUTPATIENT
Start: 2025-02-19

## 2025-03-26 DIAGNOSIS — F41.9 ANXIETY: ICD-10-CM

## 2025-03-27 RX ORDER — ESCITALOPRAM OXALATE 10 MG/1
TABLET ORAL
Qty: 30 TABLET | Refills: 5 | Status: SHIPPED | OUTPATIENT
Start: 2025-03-27

## 2025-07-24 ENCOUNTER — APPOINTMENT (OUTPATIENT)
Dept: LAB | Facility: CLINIC | Age: 45
End: 2025-07-24
Payer: COMMERCIAL

## 2025-07-24 DIAGNOSIS — E78.5 HYPERLIPIDEMIA, UNSPECIFIED HYPERLIPIDEMIA TYPE: ICD-10-CM

## 2025-07-24 LAB
ALBUMIN SERPL BCG-MCNC: 4.2 G/DL (ref 3.5–5)
ALP SERPL-CCNC: 86 U/L (ref 34–104)
ALT SERPL W P-5'-P-CCNC: 47 U/L (ref 7–52)
ANION GAP SERPL CALCULATED.3IONS-SCNC: 11 MMOL/L (ref 4–13)
AST SERPL W P-5'-P-CCNC: 24 U/L (ref 13–39)
BILIRUB SERPL-MCNC: 1.24 MG/DL (ref 0.2–1)
BUN SERPL-MCNC: 11 MG/DL (ref 5–25)
CALCIUM SERPL-MCNC: 8.9 MG/DL (ref 8.4–10.2)
CHLORIDE SERPL-SCNC: 104 MMOL/L (ref 96–108)
CHOLEST SERPL-MCNC: 172 MG/DL (ref ?–200)
CO2 SERPL-SCNC: 25 MMOL/L (ref 21–32)
CREAT SERPL-MCNC: 0.82 MG/DL (ref 0.6–1.3)
GFR SERPL CREATININE-BSD FRML MDRD: 106 ML/MIN/1.73SQ M
GLUCOSE P FAST SERPL-MCNC: 110 MG/DL (ref 65–99)
HDLC SERPL-MCNC: 36 MG/DL
LDLC SERPL CALC-MCNC: 108 MG/DL (ref 0–100)
POTASSIUM SERPL-SCNC: 3.6 MMOL/L (ref 3.5–5.3)
PROT SERPL-MCNC: 6.5 G/DL (ref 6.4–8.4)
SODIUM SERPL-SCNC: 140 MMOL/L (ref 135–147)
TRIGL SERPL-MCNC: 138 MG/DL (ref ?–150)

## 2025-07-24 PROCEDURE — 80053 COMPREHEN METABOLIC PANEL: CPT

## 2025-07-24 PROCEDURE — 80061 LIPID PANEL: CPT

## 2025-07-24 PROCEDURE — 36415 COLL VENOUS BLD VENIPUNCTURE: CPT

## 2025-07-25 ENCOUNTER — OFFICE VISIT (OUTPATIENT)
Dept: FAMILY MEDICINE CLINIC | Facility: CLINIC | Age: 45
End: 2025-07-25
Payer: COMMERCIAL

## 2025-07-25 VITALS
SYSTOLIC BLOOD PRESSURE: 134 MMHG | WEIGHT: 235 LBS | HEART RATE: 86 BPM | OXYGEN SATURATION: 98 % | BODY MASS INDEX: 37.93 KG/M2 | DIASTOLIC BLOOD PRESSURE: 84 MMHG

## 2025-07-25 DIAGNOSIS — R73.01 IMPAIRED FASTING GLUCOSE: ICD-10-CM

## 2025-07-25 DIAGNOSIS — F41.9 ANXIETY: ICD-10-CM

## 2025-07-25 DIAGNOSIS — I10 ESSENTIAL (PRIMARY) HYPERTENSION: Primary | ICD-10-CM

## 2025-07-25 DIAGNOSIS — E78.5 HYPERLIPIDEMIA, UNSPECIFIED HYPERLIPIDEMIA TYPE: ICD-10-CM

## 2025-07-25 PROCEDURE — 99214 OFFICE O/P EST MOD 30 MIN: CPT | Performed by: PHYSICIAN ASSISTANT

## 2025-07-25 RX ORDER — ATORVASTATIN CALCIUM 20 MG/1
20 TABLET, FILM COATED ORAL DAILY
Qty: 90 TABLET | Refills: 1 | Status: SHIPPED | OUTPATIENT
Start: 2025-07-25